# Patient Record
Sex: FEMALE | Race: BLACK OR AFRICAN AMERICAN | NOT HISPANIC OR LATINO | URBAN - METROPOLITAN AREA
[De-identification: names, ages, dates, MRNs, and addresses within clinical notes are randomized per-mention and may not be internally consistent; named-entity substitution may affect disease eponyms.]

---

## 2017-10-24 ENCOUNTER — COMMUNICATION - HEALTHEAST (OUTPATIENT)
Dept: FAMILY MEDICINE | Facility: CLINIC | Age: 52
End: 2017-10-24

## 2017-10-24 DIAGNOSIS — E87.6 HYPOKALEMIA: ICD-10-CM

## 2017-11-10 ENCOUNTER — COMMUNICATION - HEALTHEAST (OUTPATIENT)
Dept: FAMILY MEDICINE | Facility: CLINIC | Age: 52
End: 2017-11-10

## 2017-11-10 DIAGNOSIS — E87.6 HYPOKALEMIA: ICD-10-CM

## 2017-11-10 DIAGNOSIS — I10 HYPERTENSION: ICD-10-CM

## 2017-12-11 ENCOUNTER — AMBULATORY - HEALTHEAST (OUTPATIENT)
Dept: FAMILY MEDICINE | Facility: CLINIC | Age: 52
End: 2017-12-11

## 2017-12-11 ENCOUNTER — COMMUNICATION - HEALTHEAST (OUTPATIENT)
Dept: FAMILY MEDICINE | Facility: CLINIC | Age: 52
End: 2017-12-11

## 2017-12-11 DIAGNOSIS — I10 HYPERTENSION: ICD-10-CM

## 2017-12-11 DIAGNOSIS — I10 ESSENTIAL HYPERTENSION WITH GOAL BLOOD PRESSURE LESS THAN 140/90: ICD-10-CM

## 2017-12-11 DIAGNOSIS — Z12.31 VISIT FOR SCREENING MAMMOGRAM: ICD-10-CM

## 2017-12-18 ENCOUNTER — RECORDS - HEALTHEAST (OUTPATIENT)
Dept: MAMMOGRAPHY | Facility: CLINIC | Age: 52
End: 2017-12-18

## 2017-12-18 ENCOUNTER — COMMUNICATION - HEALTHEAST (OUTPATIENT)
Dept: TELEHEALTH | Facility: CLINIC | Age: 52
End: 2017-12-18

## 2017-12-18 ENCOUNTER — OFFICE VISIT - HEALTHEAST (OUTPATIENT)
Dept: FAMILY MEDICINE | Facility: CLINIC | Age: 52
End: 2017-12-18

## 2017-12-18 DIAGNOSIS — Z12.31 ENCOUNTER FOR SCREENING MAMMOGRAM FOR MALIGNANT NEOPLASM OF BREAST: ICD-10-CM

## 2017-12-18 DIAGNOSIS — R74.01 NONSPECIFIC ELEVATION OF LEVELS OF TRANSAMINASE OR LACTIC ACID DEHYDROGENASE (LDH): ICD-10-CM

## 2017-12-18 DIAGNOSIS — Z00.00 ANNUAL PHYSICAL EXAM: ICD-10-CM

## 2017-12-18 DIAGNOSIS — Z23 NEED FOR IMMUNIZATION AGAINST INFLUENZA: ICD-10-CM

## 2017-12-18 DIAGNOSIS — R74.02 NONSPECIFIC ELEVATION OF LEVELS OF TRANSAMINASE OR LACTIC ACID DEHYDROGENASE (LDH): ICD-10-CM

## 2017-12-18 DIAGNOSIS — E78.00 PURE HYPERCHOLESTEROLEMIA: ICD-10-CM

## 2017-12-18 DIAGNOSIS — I10 ESSENTIAL HYPERTENSION: ICD-10-CM

## 2017-12-18 DIAGNOSIS — N92.6 IRREGULAR MENSES: ICD-10-CM

## 2017-12-18 DIAGNOSIS — E87.6 HYPOKALEMIA: ICD-10-CM

## 2017-12-18 LAB
ANION GAP SERPL CALCULATED.3IONS-SCNC: 12 MMOL/L (ref 5–18)
BUN SERPL-MCNC: 10 MG/DL (ref 8–22)
CALCIUM SERPL-MCNC: 9 MG/DL (ref 8.5–10.5)
CHLORIDE SERPLBLD-SCNC: 107 MMOL/L (ref 98–107)
CHOLEST SERPL-MCNC: 98 MG/DL
CHOLEST SERPL-MCNC: 98 MG/DL (ref ?–199)
CO2 SERPL-SCNC: 24 MMOL/L (ref 22–31)
CREAT SERPL-MCNC: 0.82 MG/DL (ref 0.6–1.1)
ERYTHROCYTE [DISTWIDTH] IN BLOOD BY AUTOMATED COUNT: 12.2 % (ref 11–14.5)
FASTING STATUS PATIENT QL REPORTED: YES
GFR SERPL CREATININE-BSD FRML MDRD: >60 ML/MIN/1.73M2
GLUCOSE SERPL-MCNC: 91 MG/DL (ref 70–125)
HBA1C MFR BLD: 5.5 % (ref 3.5–6)
HCT VFR BLD AUTO: 40.2 % (ref 35–47)
HDLC SERPL-MCNC: 54 MG/DL
HDLC SERPL-MCNC: 54 MG/DL (ref 50–?)
HEMOGLOBIN: 13.2 G/DL (ref 12–16)
LDLC SERPL CALC-MCNC: 36 MG/DL
LDLC SERPL CALC-MCNC: 36 MG/DL (ref ?–129)
MCH RBC QN AUTO: 29.5 PG (ref 27–34)
MCHC RBC AUTO-ENTMCNC: 32.7 G/DL (ref 32–36)
MCV RBC AUTO: 90 FL (ref 80–100)
PLATELET # BLD AUTO: 271 10^9/L (ref 140–440)
PMV BLD: 7.9 FL (ref 7–10)
POTASSIUM SERPL-SCNC: 4 MMOL/L (ref 3.5–5)
RBC # BLD AUTO: 4.46 10^12/L (ref 3.8–5.4)
SODIUM SERPL-SCNC: 143 MMOL/L (ref 136–145)
TRIGL SERPL-MCNC: 38 MG/DL
TRIGL SERPL-MCNC: 38 MG/DL (ref ?–149)
WBC # BLD AUTO: 4.8 10^9/L (ref 4–11)

## 2017-12-18 ASSESSMENT — MIFFLIN-ST. JEOR: SCORE: 1826.46

## 2017-12-20 LAB
HUMAN PAPILLOMA VIRUS 16 DNA: NEGATIVE
HUMAN PAPILLOMA VIRUS 18 DNA: NEGATIVE
HUMAN PAPILLOMA VIRUS FINAL DIAGNOSIS: NORMAL
HUMAN PAPILLOMA VIRUS OTHER HR: NEGATIVE
SPECIMEN DESCRIPTION: NORMAL

## 2017-12-22 LAB
BKR LAB AP ABNORMAL BLEEDING: YES
BKR LAB AP BIRTH CONTROL/HORMONES: NORMAL
BKR LAB AP CERVICAL APPEARANCE: NORMAL
BKR LAB AP GYN ADEQUACY: NORMAL
BKR LAB AP GYN INTERPRETATION: NORMAL
BKR LAB AP GYN OTHER FINDINGS: NORMAL
BKR LAB AP HPV REFLEX: NORMAL
BKR LAB AP LMP: NORMAL
BKR LAB AP PATIENT STATUS: NORMAL
BKR LAB AP PREVIOUS ABNORMAL: NORMAL
BKR LAB AP PREVIOUS NORMAL: NORMAL
HIGH RISK?: NO
PATH REPORT.COMMENTS IMP SPEC: NORMAL
RESULT FLAG (HE HISTORICAL CONVERSION): NORMAL

## 2017-12-26 ENCOUNTER — COMMUNICATION - HEALTHEAST (OUTPATIENT)
Dept: FAMILY MEDICINE | Facility: CLINIC | Age: 52
End: 2017-12-26

## 2018-01-06 ENCOUNTER — COMMUNICATION - HEALTHEAST (OUTPATIENT)
Dept: FAMILY MEDICINE | Facility: CLINIC | Age: 53
End: 2018-01-06

## 2018-01-06 DIAGNOSIS — E78.00 PURE HYPERCHOLESTEROLEMIA: ICD-10-CM

## 2018-01-06 DIAGNOSIS — Z79.01 ANTICOAGULANT LONG-TERM USE: ICD-10-CM

## 2018-01-09 ENCOUNTER — COMMUNICATION - HEALTHEAST (OUTPATIENT)
Dept: FAMILY MEDICINE | Facility: CLINIC | Age: 53
End: 2018-01-09

## 2018-01-09 ENCOUNTER — AMBULATORY - HEALTHEAST (OUTPATIENT)
Dept: NURSING | Facility: CLINIC | Age: 53
End: 2018-01-09

## 2018-01-09 DIAGNOSIS — H53.9 VISION ABNORMALITIES: ICD-10-CM

## 2018-02-12 ENCOUNTER — COMMUNICATION - HEALTHEAST (OUTPATIENT)
Dept: FAMILY MEDICINE | Facility: CLINIC | Age: 53
End: 2018-02-12

## 2018-02-12 DIAGNOSIS — E87.6 HYPOKALEMIA: ICD-10-CM

## 2018-02-12 DIAGNOSIS — I10 HYPERTENSION: ICD-10-CM

## 2018-04-05 ENCOUNTER — COMMUNICATION - HEALTHEAST (OUTPATIENT)
Dept: FAMILY MEDICINE | Facility: CLINIC | Age: 53
End: 2018-04-05

## 2018-04-05 ENCOUNTER — OFFICE VISIT - HEALTHEAST (OUTPATIENT)
Dept: FAMILY MEDICINE | Facility: CLINIC | Age: 53
End: 2018-04-05

## 2018-04-05 DIAGNOSIS — I10 ESSENTIAL HYPERTENSION WITH GOAL BLOOD PRESSURE LESS THAN 140/90: ICD-10-CM

## 2018-04-05 DIAGNOSIS — I10 HYPERTENSION: ICD-10-CM

## 2018-04-05 DIAGNOSIS — I25.10 CORONARY ARTERY DISEASE INVOLVING NATIVE CORONARY ARTERY: ICD-10-CM

## 2018-04-05 DIAGNOSIS — I10 ESSENTIAL HYPERTENSION: ICD-10-CM

## 2018-04-05 DIAGNOSIS — E66.01 MORBID OBESITY (H): ICD-10-CM

## 2018-04-18 ENCOUNTER — DOCUMENTATION ONLY (OUTPATIENT)
Dept: CARDIOLOGY | Facility: CLINIC | Age: 53
End: 2018-04-18

## 2018-04-26 ENCOUNTER — RECORDS - HEALTHEAST (OUTPATIENT)
Dept: ADMINISTRATIVE | Facility: OTHER | Age: 53
End: 2018-04-26

## 2018-04-26 ENCOUNTER — OFFICE VISIT (OUTPATIENT)
Dept: CARDIOLOGY | Facility: CLINIC | Age: 53
End: 2018-04-26
Payer: COMMERCIAL

## 2018-04-26 VITALS
HEART RATE: 84 BPM | WEIGHT: 272.2 LBS | HEIGHT: 68 IN | DIASTOLIC BLOOD PRESSURE: 92 MMHG | SYSTOLIC BLOOD PRESSURE: 138 MMHG | BODY MASS INDEX: 41.25 KG/M2

## 2018-04-26 DIAGNOSIS — I25.42 DISSECTION OF CORONARY ARTERY: ICD-10-CM

## 2018-04-26 DIAGNOSIS — I24.9 ACS (ACUTE CORONARY SYNDROME) (H): Primary | ICD-10-CM

## 2018-04-26 DIAGNOSIS — R07.9 CHEST PAIN, UNSPECIFIED TYPE: ICD-10-CM

## 2018-04-26 PROCEDURE — 93000 ELECTROCARDIOGRAM COMPLETE: CPT | Performed by: INTERNAL MEDICINE

## 2018-04-26 PROCEDURE — 99214 OFFICE O/P EST MOD 30 MIN: CPT | Performed by: INTERNAL MEDICINE

## 2018-04-26 NOTE — LETTER
4/26/2018      [address] [address]      RE: Chula Palacios       Dear Colleague,    I had the pleasure of seeing Chula Palacios in the [facility].    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  IVUS was performed.  I have looked at those images.  She had no significant disease in the remainder of her coronary arteries, and she was managed medically for this, as she had NAFISA 3 flow.  She was placed on Brilinta, and actually she has not been taken off of it.  She also was noted to have hypertension and was started on Norvasc with the Cozaar and metoprolol.  She is also on Lipitor 40.  She has not had any concerning symptoms of chest pain, tightness or shortness of breath.  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  She has 3 grown children.  No tobacco use and is single.  There is no family history of early coronary artery disease.      Ms. Palacios has not had evaluation for FMD, so that would be indicated and I discussed that with her today.  We also discussed she did not have evaluation following her heart attack to look for ischemia, and actually a couple weeks ago, she was going up and down flights of stairs with heavy objects and had some pain under her breast bone.  It was worse with deep inspiration and better at rest.  She saw her primary physician, and they thought it was maybe a pulled muscle and has since improved.  Her blood pressure was up a bit today, but she reports she checks it at home and it is not as elevated, was 138/90.  Her weight has gone up about 20 pounds since 2015, and she knows she needs to work on diet and exercise.      IMPRESSION, REPORT, PLAN:   1.  Spontaneous coronary artery dissection of the  mid-LAD, managed medically, can come off Brilinta.   2.  Hypertension.   3.  Hyperlipidemia.   4.  Need for FMD evaluation.   5.  Obesity.      DISCUSSION:  It was a pleasure to see Ms. Palacios in Cardiology Clinic.  Today I discussed her history and symptoms as well as reviewed all of her previous imaging.  I would like to evaluate for FMD, so I ordered a CT of the head, neck, chest, abdomen and pelvis.  In addition, I discussed blood pressure is up a bit.  When she comes back, we will recheck this and may have to adjust her medications, maybe increase her Cozaar if it still remains high, and then we will do an exercise stress echo.  She understands and is in agreement with the plan as outlined.  All questions were answered.  It was a pleasure to see her.  Please do not hesitate to contact me with any questions or concerns.         ALANNAH CUNNINGHAM MD             D: 2018   T: 2018   MT: MARYANA      Name:     CARI PALACIOS   MRN:      [id number]        Account:      [id number]   :      1965           Service Date: 2018      Document: [id number]         Outpatient Encounter Prescriptions as of 2018   Medication Sig Dispense Refill     amLODIPine (NORVASC) 10 MG tablet Take 1 tablet (10 mg) by mouth daily 90 tablet 0     aspirin EC 81 MG EC tablet Take 1 tablet (81 mg) by mouth daily       atorvastatin (LIPITOR) 40 MG tablet Take 1 tablet (40 mg) by mouth daily 30 tablet 12     losartan (COZAAR) 50 MG tablet Take 1 tablet (50 mg) by mouth daily 90 tablet 0     metoprolol (LOPRESSOR) 100 MG tablet Take 1 tablet (100 mg) by mouth 2 times daily 60 tablet 12     nitroglycerin (NITROSTAT) 0.4 MG SL tablet Place 1 tablet (0.4 mg) under the tongue every 5 minutes as needed for chest pain (Maximum 3 doses in 15 minutes, if needed.) 25 tablet 1     Potassium Chloride ER 20 MEQ TBCR Take 1 tablet (20 mEq) by mouth daily 90 tablet 4     [DISCONTINUED] ticagrelor (BRILINTA) 90 MG tablet Take 1  tablet (90 mg) by mouth 2 times daily 60 tablet 12     No facility-administered encounter medications on file as of 4/26/2018.            Again, thank you for allowing me to participate in the care of your patient.      Sincerely,    Lilly Munroe MD     [facility]

## 2018-04-26 NOTE — PROGRESS NOTES
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  IVUS was performed.  I have looked at those images.  She had no significant disease in the remainder of her coronary arteries, and she was managed medically for this, as she had NAFISA 3 flow.  She was placed on Brilinta, and actually she has not been taken off of it.  She also was noted to have hypertension and was started on Norvasc with the Cozaar and metoprolol.  She is also on Lipitor 40.  She has not had any concerning symptoms of chest pain, tightness or shortness of breath.  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  She has 3 grown children.  No tobacco use and is single.  There is no family history of early coronary artery disease.      Ms. Palacios has not had evaluation for FMD, so that would be indicated and I discussed that with her today.  We also discussed she did not have evaluation following her heart attack to look for ischemia, and actually a couple weeks ago, she was going up and down flights of stairs with heavy objects and had some pain under her breast bone.  It was worse with deep inspiration and better at rest.  She saw her primary physician, and they thought it was maybe a pulled muscle and has since improved.  Her blood pressure was up a bit today, but she reports she checks it at home and it is not as elevated, was 138/90.  Her weight has gone up about 20 pounds since 2015, and she knows she needs to work on diet and exercise.      IMPRESSION, REPORT, PLAN:   1.  Spontaneous coronary artery dissection of the mid-LAD, managed medically, can come off Brilinta.   2.  Hypertension.   3.  Hyperlipidemia.   4.  Need for FMD evaluation.   5.  Obesity.      DISCUSSION:  It was a pleasure to see Ms. Palacios in Cardiology Clinic.  Today I  discussed her history and symptoms as well as reviewed all of her previous imaging.  I would like to evaluate for FMD, so I ordered a CT of the head, neck, chest, abdomen and pelvis.  In addition, I discussed blood pressure is up a bit.  When she comes back, we will recheck this and may have to adjust her medications, maybe increase her Cozaar if it still remains high, and then we will do an exercise stress echo.  She understands and is in agreement with the plan as outlined.  All questions were answered.  It was a pleasure to see her.  Please do not hesitate to contact me with any questions or concerns.         ALANNAH CUNNINGHAM MD             D: 2018   T: 2018   MT: MRAYANA      Name:     CARI DE LA ROSA   MRN:      [id number]        Account:      [id number]   :      1965           Service Date: 2018      Document: [id number]

## 2018-04-26 NOTE — LETTER
4/26/2018    [address] [address]    RE: Chula Palacios       Dear Colleague,    I had the pleasure of seeing Chula Palacios in the [facility].    HPI:     Please see dictated note    PAST MEDICAL HISTORY:  Past Medical History:   Diagnosis Date     Chest pain 5/26/2015     Dissection of coronary artery      Hypertension      NSTEMI (non-ST elevated myocardial infarction) (H) 5/2015       CURRENT MEDICATIONS:  Current Outpatient Prescriptions   Medication Sig Dispense Refill     amLODIPine (NORVASC) 10 MG tablet Take 1 tablet (10 mg) by mouth daily 90 tablet 0     aspirin EC 81 MG EC tablet Take 1 tablet (81 mg) by mouth daily       atorvastatin (LIPITOR) 40 MG tablet Take 1 tablet (40 mg) by mouth daily 30 tablet 12     losartan (COZAAR) 50 MG tablet Take 1 tablet (50 mg) by mouth daily 90 tablet 0     metoprolol (LOPRESSOR) 100 MG tablet Take 1 tablet (100 mg) by mouth 2 times daily 60 tablet 12     nitroglycerin (NITROSTAT) 0.4 MG SL tablet Place 1 tablet (0.4 mg) under the tongue every 5 minutes as needed for chest pain (Maximum 3 doses in 15 minutes, if needed.) 25 tablet 1     Potassium Chloride ER 20 MEQ TBCR Take 1 tablet (20 mEq) by mouth daily 90 tablet 4     ticagrelor (BRILINTA) 90 MG tablet Take 1 tablet (90 mg) by mouth 2 times daily 60 tablet 12       PAST SURGICAL HISTORY:  Past Surgical History:   Procedure Laterality Date     CORONARY ANGIOGRAPHY ADULT ORDER  5/2015    coronary artery dissection       ALLERGIES     Allergies   Allergen Reactions     Imdur [Isosorbide]      Headaches       FAMILY HISTORY:  Family History   Problem Relation Age of Onset     Hypertension Father        SOCIAL HISTORY:  Social History     Social History     Marital status: Single     Spouse name: N/A     Number of children: N/A     Years of education: N/A     Social History Main Topics     Smoking status: Never Smoker     Smokeless tobacco: Never  "Used     Alcohol use Yes      Comment: wine occasionally      Drug use: No     Sexual activity: Yes     Partners: Male     Other Topics Concern     Caffeine Concern No     not currently     Sleep Concern No     most of the time     Stress Concern No     Weight Concern No     Special Diet Yes     low sodium, low cholesterol, low fat     Exercise Yes     cardiac rehab      Seat Belt Yes     Social History Narrative       ROS:   Constitutional: No fever, chills, or sweats. No weight gain/loss   ENT: No visual disturbance, ear ache, epistaxis, sore throat  Allergies/Immunologic: Negative.   Respiratory: No cough, hemoptysia  Cardiovascular: As per HPI  GI: No nausea, vomiting, hematemesis, melena, or hematochezia  : No urinary frequency, dysuria, or hematuria  Integument: Negative  Psychiatric: Negative  Neuro: Negative  Endocrinology: Negative   Musculoskeletal: Negative    EXAM:  BP (!) 138/92  Pulse 84  Ht 1.727 m (5' 8\")  Wt 123.5 kg (272 lb 3.2 oz)  BMI 41.39 kg/m2  In general, the patient is a pleasant female in no apparent distress.    HEENT: NC/AT.  PERRLA.  EOMI.  Sclerae white, not injected.  Nares clear.  Pharynx without erythema or exudate.  Dentition intact.    Neck: No adenopathy.  No thyromegaly. Carotids +4/4 bilaterally without bruits.  No jugular venous distension.   Heart: RRR. Normal S1, S2 splits physiologically. No murmur, rub, click, or gallop.  Lungs: CTA.  No ronchi, wheezes, rales.    Abdomen: Soft, nontender, nondistended.  Extremities: No clubbing, cyanosis, or edema.    Neurologic: Alert and oriented to person/place/time, normal speech, gait and affect  Skin: No petechiae, purpura or rash.    Labs:  LIPID RESULTS:  Lab Results   Component Value Date    CHOL 98 12/18/2017    HDL 54 12/18/2017    LDL 36 12/18/2017    TRIG 38 12/18/2017    CHOLHDLRATIO 1.7 05/11/2015       LIVER ENZYME RESULTS:  Lab Results   Component Value Date    AST 18 05/10/2015    ALT 27 05/10/2015       CBC " RESULTS:  Lab Results   Component Value Date    WBC 4.8 12/18/2017    RBC 4.46 12/18/2017    HGB 13.2 12/18/2017    HCT 40.2 12/18/2017    MCV 90 12/18/2017    MCH 29.5 12/18/2017    MCHC 32.7 12/18/2017    RDW 12.2 12/18/2017     12/18/2017       BMP RESULTS:  Lab Results   Component Value Date     12/18/2017    POTASSIUM 4.0 12/18/2017    CHLORIDE 107 12/18/2017    CO2 24 12/18/2017    ANIONGAP 12 12/18/2017    GLC 91 12/18/2017    BUN 10 12/18/2017    CR 0.82 12/18/2017    GFRESTIMATED >60 12/18/2017    GFRESTBLACK >60 12/18/2017    CORTES 9.0 12/18/2017      GRAY Munroe MD     CC  Patient Care Team:  Clinic, [facility] as PCP - General  CLINIC, [facility]    Service Date: 04/26/2018      HISTORY OF PRESENT ILLNESS:  Chula is a pleasant 52-year-old woman who presented in 05/2015 with chest pain and was found to have a spontaneous coronary artery dissection of the mid LAD.  IVUS was performed.  I have looked at those images.  She had no significant disease in the remainder of her coronary arteries, and she was managed medically for this, as she had NAFISA 3 flow.  She was placed on Brilinta, and actually she has not been taken off of it.  She also was noted to have hypertension and was started on Norvasc with the Cozaar and metoprolol.  She is also on Lipitor 40.  She has not had any concerning symptoms of chest pain, tightness or shortness of breath.  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  She has 3 grown children.  No tobacco use and is single.  There is no family history of early coronary artery disease.      Ms. Palacios has not had evaluation for FMD, so that would be indicated and I discussed that with her today.  We also discussed she did not have evaluation following her heart attack to look for ischemia, and actually a couple weeks ago, she was going up and down flights of stairs with heavy objects and had some pain  under her breast bone.  It was worse with deep inspiration and better at rest.  She saw her primary physician, and they thought it was maybe a pulled muscle and has since improved.  Her blood pressure was up a bit today, but she reports she checks it at home and it is not as elevated, was 138/90.  Her weight has gone up about 20 pounds since 2015, and she knows she needs to work on diet and exercise.      IMPRESSION, REPORT, PLAN:   1.  Spontaneous coronary artery dissection of the mid-LAD, managed medically, can come off Brilinta.   2.  Hypertension.   3.  Hyperlipidemia.   4.  Need for FMD evaluation.   5.  Obesity.      DISCUSSION:  It was a pleasure to see Ms. Palacios in Cardiology Clinic.  Today I discussed her history and symptoms as well as reviewed all of her previous imaging.  I would like to evaluate for FMD, so I ordered a CT of the head, neck, chest, abdomen and pelvis.  In addition, I discussed blood pressure is up a bit.  When she comes back, we will recheck this and may have to adjust her medications, maybe increase her Cozaar if it still remains high, and then we will do an exercise stress echo.  She understands and is in agreement with the plan as outlined.  All questions were answered.  It was a pleasure to see her.  Please do not hesitate to contact me with any questions or concerns.         ALANNAH CUNNINGHAM MD             D: 2018   T: 2018   MT: MARYANA      Name:     CARI PALACIOS   MRN:      [id number]        Account:      [id number]   :      1965           Service Date: 2018      Document: [id number]       Thank you for allowing me to participate in the care of your patient.      Sincerely,     Alannah Cunningham MD     [facility]    cc:   [place] [address]

## 2018-04-26 NOTE — PROGRESS NOTES
HPI:     Please see dictated note    PAST MEDICAL HISTORY:  Past Medical History:   Diagnosis Date     Chest pain 5/26/2015     Dissection of coronary artery      Hypertension      NSTEMI (non-ST elevated myocardial infarction) (H) 5/2015       CURRENT MEDICATIONS:  Current Outpatient Prescriptions   Medication Sig Dispense Refill     amLODIPine (NORVASC) 10 MG tablet Take 1 tablet (10 mg) by mouth daily 90 tablet 0     aspirin EC 81 MG EC tablet Take 1 tablet (81 mg) by mouth daily       atorvastatin (LIPITOR) 40 MG tablet Take 1 tablet (40 mg) by mouth daily 30 tablet 12     losartan (COZAAR) 50 MG tablet Take 1 tablet (50 mg) by mouth daily 90 tablet 0     metoprolol (LOPRESSOR) 100 MG tablet Take 1 tablet (100 mg) by mouth 2 times daily 60 tablet 12     nitroglycerin (NITROSTAT) 0.4 MG SL tablet Place 1 tablet (0.4 mg) under the tongue every 5 minutes as needed for chest pain (Maximum 3 doses in 15 minutes, if needed.) 25 tablet 1     Potassium Chloride ER 20 MEQ TBCR Take 1 tablet (20 mEq) by mouth daily 90 tablet 4     ticagrelor (BRILINTA) 90 MG tablet Take 1 tablet (90 mg) by mouth 2 times daily 60 tablet 12       PAST SURGICAL HISTORY:  Past Surgical History:   Procedure Laterality Date     CORONARY ANGIOGRAPHY ADULT ORDER  5/2015    coronary artery dissection       ALLERGIES     Allergies   Allergen Reactions     Imdur [Isosorbide]      Headaches       FAMILY HISTORY:  Family History   Problem Relation Age of Onset     Hypertension Father        SOCIAL HISTORY:  Social History     Social History     Marital status: Single     Spouse name: N/A     Number of children: N/A     Years of education: N/A     Social History Main Topics     Smoking status: Never Smoker     Smokeless tobacco: Never Used     Alcohol use Yes      Comment: wine occasionally      Drug use: No     Sexual activity: Yes     Partners: Male     Other Topics Concern     Caffeine Concern No     not currently     Sleep Concern No     most of  "the time     Stress Concern No     Weight Concern No     Special Diet Yes     low sodium, low cholesterol, low fat     Exercise Yes     cardiac rehab      Seat Belt Yes     Social History Narrative       ROS:   Constitutional: No fever, chills, or sweats. No weight gain/loss   ENT: No visual disturbance, ear ache, epistaxis, sore throat  Allergies/Immunologic: Negative.   Respiratory: No cough, hemoptysia  Cardiovascular: As per HPI  GI: No nausea, vomiting, hematemesis, melena, or hematochezia  : No urinary frequency, dysuria, or hematuria  Integument: Negative  Psychiatric: Negative  Neuro: Negative  Endocrinology: Negative   Musculoskeletal: Negative    EXAM:  BP (!) 138/92  Pulse 84  Ht 1.727 m (5' 8\")  Wt 123.5 kg (272 lb 3.2 oz)  BMI 41.39 kg/m2  In general, the patient is a pleasant female in no apparent distress.    HEENT: NC/AT.  PERRLA.  EOMI.  Sclerae white, not injected.  Nares clear.  Pharynx without erythema or exudate.  Dentition intact.    Neck: No adenopathy.  No thyromegaly. Carotids +4/4 bilaterally without bruits.  No jugular venous distension.   Heart: RRR. Normal S1, S2 splits physiologically. No murmur, rub, click, or gallop.  Lungs: CTA.  No ronchi, wheezes, rales.    Abdomen: Soft, nontender, nondistended.  Extremities: No clubbing, cyanosis, or edema.    Neurologic: Alert and oriented to person/place/time, normal speech, gait and affect  Skin: No petechiae, purpura or rash.    Labs:  LIPID RESULTS:  Lab Results   Component Value Date    CHOL 98 12/18/2017    HDL 54 12/18/2017    LDL 36 12/18/2017    TRIG 38 12/18/2017    CHOLHDLRATIO 1.7 05/11/2015       LIVER ENZYME RESULTS:  Lab Results   Component Value Date    AST 18 05/10/2015    ALT 27 05/10/2015       CBC RESULTS:  Lab Results   Component Value Date    WBC 4.8 12/18/2017    RBC 4.46 12/18/2017    HGB 13.2 12/18/2017    HCT 40.2 12/18/2017    MCV 90 12/18/2017    MCH 29.5 12/18/2017    MCHC 32.7 12/18/2017    RDW 12.2 12/18/2017 "     12/18/2017       BMP RESULTS:  Lab Results   Component Value Date     12/18/2017    POTASSIUM 4.0 12/18/2017    CHLORIDE 107 12/18/2017    CO2 24 12/18/2017    ANIONGAP 12 12/18/2017    GLC 91 12/18/2017    BUN 10 12/18/2017    CR 0.82 12/18/2017    GFRESTIMATED >60 12/18/2017    GFRESTBLACK >60 12/18/2017    CORTES 9.0 12/18/2017      GRAY Munroe MD     CC  Patient Care Team:  Clinic, [facility] as PCP - General  CLINIC, [facility]

## 2018-04-26 NOTE — MR AVS SNAPSHOT
After Visit Summary   4/26/2018    Chula Palacios    MRN: [id number]           Patient Information     Date Of Birth          1965        Visit Information        Provider Department      4/26/2018 12:45 PM March, Lilly Bledsoe MD [facility]        Today's Diagnoses     ACS (acute coronary syndrome) (H)    -  1    Dissection of coronary artery        Chest pain, unspecified type           Follow-ups after your visit        Additional Services     Follow-Up with Cardiac Advanced Practice Provider           Follow-Up with Cardiologist                 Future tests that were ordered for you today     Open Future Orders        Priority Expected Expires Ordered    Follow-Up with Cardiologist Routine 4/26/2019 4/27/2019 4/26/2018    Follow-Up with Cardiac Advanced Practice Provider Routine 5/26/2018 4/26/2019 4/26/2018    Exercise Stress Echocardiogram Routine  4/26/2019 4/26/2018    CT Head Neck Angio w/o & w Contrast Routine  4/26/2019 4/26/2018    CT Chest Abdomen Pelvis w/o & w Contrast Routine  4/26/2019 4/26/2018            Who to contact     If you have questions or need follow up information about today's clinic visit or your schedule please contact [facility] directly at [phone].  Normal or non-critical lab and imaging results will be communicated to you by Ossiahart, letter or phone within 4 business days after the clinic has received the results. If you do not hear from us within 7 days, please contact the clinic through Ossiahart or phone. If you have a critical or abnormal lab result, we will notify you by phone as soon as possible.  Submit refill requests through [facility] or call your pharmacy and they will forward the refill request to us. Please allow 3 business days for your refill to be completed.          Additional Information About Your Visit        [facility] Information     [facility] lets you send messages to  "your doctor, view your test results, renew your prescriptions, schedule appointments and more. To sign up, go to [email or web address] . Click on \"Log in\" on the left side of the screen, which will take you to the Welcome page. Then click on \"Sign up Now\" on the right side of the page.     You will be asked to enter the access code listed below, as well as some personal information. Please follow the directions to create your username and password.     Your access code is: [email or web address]  Expires: 2018  1:12 PM     Your access code will  in 90 days. If you need help or a new code, please call your [place] clinic or [phone].        Care EveryWhere ID     This is your Care EveryWhere ID. This could be used by other organizations to access your [place] medical records  [id number]        Your Vitals Were     Pulse Height BMI (Body Mass Index)             84 1.727 m (5' 8\") 41.39 kg/m2          Blood Pressure from Last 3 Encounters:   18 (!) 138/92   10/20/15 110/80   07/20/15 118/86    Weight from Last 3 Encounters:   18 123.5 kg (272 lb 3.2 oz)   10/20/15 113.4 kg (250 lb)   07/20/15 116.6 kg (257 lb)              We Performed the Following     EKG 12-lead complete w/read - Clinics (performed today)          Today's Medication Changes          These changes are accurate as of 18  1:12 PM.  If you have any questions, ask your nurse or doctor.               Stop taking these medicines if you haven't already. Please contact your care team if you have questions.     ticagrelor 90 MG tablet   Commonly known as:  BRILINTA   Stopped by:  MarchLilly MD                    Primary Care Provider Office Phone # Fax #    [facility] [phone]       [address] [place] [address]        Equal Access to Services     JACINTO ELIZABETH AH: padmaja Cardenas, jose marceloaan ah. " So [facility] [phone].    ATENCIÓN: Si habla edgar, tiene a kline disposición servicios gratuitos de asistencia lingüística. Yvette cruz [phone].    We comply with applicable federal civil rights laws and [place] laws. We do not discriminate on the basis of race, color, national origin, age, disability, sex, sexual orientation, or gender identity.            Thank you!     Thank you for choosing [facility]  for your care. Our goal is always to provide you with excellent care. Hearing back from our patients is one way we can continue to improve our services. Please take a few minutes to complete the written survey that you may receive in the mail after your visit with us. Thank you!             Your Updated Medication List - Protect others around you: Learn how to safely use, store and throw away your medicines at www.disposemymeds.org.          This list is accurate as of 4/26/18  1:12 PM.  Always use your most recent med list.                   Brand Name Dispense Instructions for use Diagnosis    amLODIPine 10 MG tablet    NORVASC    90 tablet    Take 1 tablet (10 mg) by mouth daily    Unspecified essential hypertension       aspirin 81 MG EC tablet      Take 1 tablet (81 mg) by mouth daily    Dissection of coronary artery, ACS (acute coronary syndrome) (H), Acute myocardial infarction, initial episode of care       atorvastatin 40 MG tablet    LIPITOR    30 tablet    Take 1 tablet (40 mg) by mouth daily    Dissection of coronary artery, Acute myocardial infarction, initial episode of care       losartan 50 MG tablet    COZAAR    90 tablet    Take 1 tablet (50 mg) by mouth daily    ACS (acute coronary syndrome) (H)       metoprolol tartrate 100 MG tablet    LOPRESSOR    60 tablet    Take 1 tablet (100 mg) by mouth 2 times daily    Dissection of coronary artery, Acute myocardial infarction, initial episode of care, ACS (acute coronary syndrome) (H)       nitroGLYcerin 0.4 MG  sublingual tablet    NITROSTAT    25 tablet    Place 1 tablet (0.4 mg) under the tongue every 5 minutes as needed for chest pain (Maximum 3 doses in 15 minutes, if needed.)    Dissection of coronary artery, Acute myocardial infarction, initial episode of care, ACS (acute coronary syndrome) (H)       Potassium Chloride ER 20 MEQ Tbcr     90 tablet    Take 1 tablet (20 mEq) by mouth daily    ACS (acute coronary syndrome) (H)

## 2018-05-04 ENCOUNTER — COMMUNICATION - HEALTHEAST (OUTPATIENT)
Dept: FAMILY MEDICINE | Facility: CLINIC | Age: 53
End: 2018-05-04

## 2018-05-04 DIAGNOSIS — I25.10 CORONARY ARTERY DISEASE INVOLVING NATIVE CORONARY ARTERY: ICD-10-CM

## 2018-05-08 ENCOUNTER — RECORDS - HEALTHEAST (OUTPATIENT)
Dept: ADMINISTRATIVE | Facility: OTHER | Age: 53
End: 2018-05-08

## 2018-05-10 ENCOUNTER — HOSPITAL ENCOUNTER (OUTPATIENT)
Dept: CT IMAGING | Facility: CLINIC | Age: 53
End: 2018-05-10
Attending: INTERNAL MEDICINE
Payer: COMMERCIAL

## 2018-05-10 ENCOUNTER — HOSPITAL ENCOUNTER (OUTPATIENT)
Dept: CARDIOLOGY | Facility: CLINIC | Age: 53
Discharge: HOME | End: 2018-05-10
Attending: INTERNAL MEDICINE | Admitting: INTERNAL MEDICINE
Payer: COMMERCIAL

## 2018-05-10 DIAGNOSIS — I25.42 DISSECTION OF CORONARY ARTERY: ICD-10-CM

## 2018-05-10 LAB — RADIOLOGIST FLAGS: ABNORMAL

## 2018-05-10 PROCEDURE — 93018 CV STRESS TEST I&R ONLY: CPT | Performed by: INTERNAL MEDICINE

## 2018-05-10 PROCEDURE — 93350 STRESS TTE ONLY: CPT | Mod: 26 | Performed by: INTERNAL MEDICINE

## 2018-05-10 PROCEDURE — 25500064 ZZH RX 255 OP 636: Performed by: INTERNAL MEDICINE

## 2018-05-10 PROCEDURE — 93325 DOPPLER ECHO COLOR FLOW MAPG: CPT | Mod: 26 | Performed by: INTERNAL MEDICINE

## 2018-05-10 PROCEDURE — 25000125 ZZHC RX 250: Performed by: INTERNAL MEDICINE

## 2018-05-10 PROCEDURE — 25000128 H RX IP 250 OP 636: Performed by: INTERNAL MEDICINE

## 2018-05-10 PROCEDURE — 93017 CV STRESS TEST TRACING ONLY: CPT

## 2018-05-10 PROCEDURE — 71270 CT THORAX DX C-/C+: CPT

## 2018-05-10 PROCEDURE — 93016 CV STRESS TEST SUPVJ ONLY: CPT | Performed by: INTERNAL MEDICINE

## 2018-05-10 PROCEDURE — 93321 DOPPLER ECHO F-UP/LMTD STD: CPT | Mod: 26 | Performed by: INTERNAL MEDICINE

## 2018-05-10 PROCEDURE — 70498 CT ANGIOGRAPHY NECK: CPT

## 2018-05-10 RX ORDER — IOPAMIDOL 755 MG/ML
100 INJECTION, SOLUTION INTRAVASCULAR ONCE
Status: COMPLETED | OUTPATIENT
Start: 2018-05-10 | End: 2018-05-10

## 2018-05-10 RX ADMIN — SODIUM CHLORIDE 90 ML: 9 INJECTION, SOLUTION INTRAVENOUS at 14:10

## 2018-05-10 RX ADMIN — IOPAMIDOL 100 ML: 755 INJECTION, SOLUTION INTRAVENOUS at 14:10

## 2018-05-10 RX ADMIN — HUMAN ALBUMIN MICROSPHERES AND PERFLUTREN 2 ML: 10; .22 INJECTION, SOLUTION INTRAVENOUS at 11:32

## 2018-05-11 ENCOUNTER — TELEPHONE (OUTPATIENT)
Dept: CARDIOLOGY | Facility: CLINIC | Age: 53
End: 2018-05-11

## 2018-05-11 DIAGNOSIS — R91.8 PULMONARY NODULES: ICD-10-CM

## 2018-05-11 DIAGNOSIS — I25.42 SPONTANEOUS DISSECTION OF CORONARY ARTERY: ICD-10-CM

## 2018-05-11 DIAGNOSIS — N63.0 BREAST NODULE: Primary | ICD-10-CM

## 2018-05-11 NOTE — TELEPHONE ENCOUNTER
Call received from [facility]- incidental findings on CT chest, abd, pelvis, showing 1.4cm nodule breast and scattered pulmonary nodules. Radiologist recommends follow up. Will alert Dr. Munroe.

## 2018-05-11 NOTE — TELEPHONE ENCOUNTER
Received message below from Dr. Munroe. Orders entered for mammogram, follow up and CT of chest in 3 months. Called to pt and gave number to [facility] to schedule the mammogram. Pt verbalized understanding and stated she would call to schedule.

## 2018-05-11 NOTE — TELEPHONE ENCOUNTER
Lilly Munroe MD Sohn, Linda J, RN; Jackelyn Chacon, RN; PIETER [facility] Heart Team 1        Caller: Unspecified (Today,  8:25 AM)                       Hello team -   I called the patient and let her know that I would like us to set up for her to have a mammogram now (would do MRI mammogram if possible) and follow up CT of the chest for pulmonary nodules in 3 months.  I should see her back in 3 months.   Thank you,   apolonia

## 2018-05-16 ENCOUNTER — RECORDS - HEALTHEAST (OUTPATIENT)
Dept: ADMINISTRATIVE | Facility: OTHER | Age: 53
End: 2018-05-16

## 2018-05-16 ENCOUNTER — OFFICE VISIT (OUTPATIENT)
Dept: CARDIOLOGY | Facility: CLINIC | Age: 53
End: 2018-05-16
Attending: INTERNAL MEDICINE
Payer: COMMERCIAL

## 2018-05-16 VITALS
HEART RATE: 85 BPM | DIASTOLIC BLOOD PRESSURE: 87 MMHG | WEIGHT: 269.5 LBS | BODY MASS INDEX: 40.98 KG/M2 | SYSTOLIC BLOOD PRESSURE: 131 MMHG

## 2018-05-16 DIAGNOSIS — E78.2 MIXED HYPERLIPIDEMIA: Primary | ICD-10-CM

## 2018-05-16 DIAGNOSIS — I25.42 DISSECTION OF CORONARY ARTERY: ICD-10-CM

## 2018-05-16 DIAGNOSIS — J30.2 ACUTE SEASONAL ALLERGIC RHINITIS, UNSPECIFIED TRIGGER: ICD-10-CM

## 2018-05-16 DIAGNOSIS — I24.9 ACS (ACUTE CORONARY SYNDROME) (H): ICD-10-CM

## 2018-05-16 DIAGNOSIS — I21.9 ACUTE MYOCARDIAL INFARCTION, INITIAL EPISODE OF CARE (H): ICD-10-CM

## 2018-05-16 DIAGNOSIS — I10 BENIGN ESSENTIAL HYPERTENSION: ICD-10-CM

## 2018-05-16 PROCEDURE — 99214 OFFICE O/P EST MOD 30 MIN: CPT | Performed by: NURSE PRACTITIONER

## 2018-05-16 RX ORDER — NITROGLYCERIN 0.4 MG/1
0.4 TABLET SUBLINGUAL EVERY 5 MIN PRN
Qty: 25 TABLET | Refills: 1 | Status: SHIPPED | OUTPATIENT
Start: 2018-05-16

## 2018-05-16 NOTE — LETTER
5/16/2018    [address] [address]    RE: Chula Silvabarrett       Dear Colleague,    I had the pleasure of seeing Chula Palacios in the [facility].    HPI and Plan:   I had the pleasure of seeing Chula Palacios today in cardiology clinic follow up. She is a pleasant 52 year old patient who recently started seeing Dr. Munroe. Ms. 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  She continued on Brilinta until her visit with Dr. Munroe last month.  She does not have a family history of  premature coronary artery disease, she is a non-smoker.    Dr. Munroe recommended a CT to exclude FMD, and the stress test to look for any evidence of ischemia she had some pressure a few weeks prior under her breastbone which was worse with deep inspiration and better with rest.  Her stress echocardiogram showed a normal EF of 55-60%, there was a small inferior apical region of akinesis, no new stress-induced wall motion abnormalities and no ischemia sepsis suspected.  EKG G portion of the stress test was negative.  She did have a borderline hypertensive response to exercise with a peak heart rate of correction a peak blood pressure of 190/100.  She had no significant valvular disease, though she did have a hint of mild pulmonary hypertension with RVSP of 31+ right atrial pressure.  She had no symptoms during her stress test and was able to reach a target heart rate.    Her CT angiography was negative for the head and neck.  However there were incidental findings on the radiology report including a 1.4 cm soft tissue and tissue nodule in the upper outer quadrant of the left breast which was recommended to be evaluated by mammography.  In addition to this it was noted that she had two pulmonary nodules, the largest measuring 9 mm, the radiologist recommended a 3  month follow-up CT.     Dr. Munroe actually ordered a mammogram and follow-up CT, however I learned today that she has had mammograms done previously in the [facility] system, there was some abnormalities on them in the past requiring further evaluation and they were compared to a mammogram she had done in [place].    Physical Exam  Please see Below     Assessment and Plan  1.  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  She was recently discontinued from Brilinta.  She continues on a low-dose aspirin.  She continues on statin therapy.  Her blood pressure is borderline controlled.  We discussed possibly increasing her losartan 200 mg daily.  She was not very excited about increasing her blood pressure medicine.  We discussed other options including more regular  exercise and weight loss, she would like to start with this.  She is asymptomatic.  Her stress echocardiogram did not demonstrate any new areas of ischemia.  She should continue on good medical management with beta-blocker, ARB, statin and aspirin.  2.  Hypertension.  As I said above, she had a borderline hypertensive response to exercise.  Her blood pressure in clinic is borderline.  She has gained about 20 pounds in the last year and a half.  She would like to try diet and exercise before increasing her losartan.  If her blood pressure continues to remain elevated with a systolic blood pressure over 130, then I would double her losartan and have her repeat a BMP.  Dr. Munroe wants to see her in August  3. Incidental findings.  She did have 2 incidental findings on her CT, both of which we discussed today.  She wonders, since she had her mammogram done at [facility] in December, if she needs to repeat this, she did have an ultrasound done of the left breast in 2016, she has known macrolobulated nodule that was stable in 2016.  Her mammogram in December 2017 did not describe the nodule. After our visit I called her primary,   Joseph Chaudhari and discussed the breast and lung nodules. He asked that she arrange follow up with him for both concerns.    Thank you for allowing me to care for Chula Palacios today, she is scheduled to see Dr. Munroe again in September.    YEISON Das, CNP  Cardiology    Voice recognition software was used for this note, I have reviewed this note, but errors may have been missed.    No orders of the defined types were placed in this encounter.    Orders Placed This Encounter   Medications     cetirizine HCl 10 MG CAPS     Sig: Take 10 mg by mouth daily as needed     Dispense:  30 capsule     Refill:  3     nitroGLYcerin (NITROSTAT) 0.4 MG sublingual tablet     Sig: Place 1 tablet (0.4 mg) under the tongue every 5 minutes as needed for chest pain (Maximum 3 doses in 15 minutes, if needed.)     Dispense:  25 tablet     Refill:  1     Medications Discontinued During This Encounter   Medication Reason     nitroglycerin (NITROSTAT) 0.4 MG SL tablet Reorder         CURRENT MEDICATIONS:  Current Outpatient Prescriptions   Medication Sig Dispense Refill     amLODIPine (NORVASC) 10 MG tablet Take 1 tablet (10 mg) by mouth daily 90 tablet 0     aspirin EC 81 MG EC tablet Take 1 tablet (81 mg) by mouth daily       atorvastatin (LIPITOR) 40 MG tablet Take 1 tablet (40 mg) by mouth daily 30 tablet 12     cetirizine HCl 10 MG CAPS Take 10 mg by mouth daily as needed 30 capsule 3     losartan (COZAAR) 50 MG tablet Take 1 tablet (50 mg) by mouth daily 90 tablet 0     metoprolol (LOPRESSOR) 100 MG tablet Take 1 tablet (100 mg) by mouth 2 times daily 60 tablet 12     nitroGLYcerin (NITROSTAT) 0.4 MG sublingual tablet Place 1 tablet (0.4 mg) under the tongue every 5 minutes as needed for chest pain (Maximum 3 doses in 15 minutes, if needed.) 25 tablet 1     Potassium Chloride ER 20 MEQ TBCR Take 1 tablet (20 mEq) by mouth daily 90 tablet 4     [DISCONTINUED] nitroglycerin (NITROSTAT) 0.4 MG SL tablet Place 1 tablet (0.4 mg) under  the tongue every 5 minutes as needed for chest pain (Maximum 3 doses in 15 minutes, if needed.) 25 tablet 1       ALLERGIES     Allergies   Allergen Reactions     Imdur [Isosorbide]      Headaches       PAST MEDICAL HISTORY:  Past Medical History:   Diagnosis Date     Chest pain 5/26/2015     Dissection of coronary artery      Hypertension      NSTEMI (non-ST elevated myocardial infarction) (H) 5/2015       PAST SURGICAL HISTORY:  Past Surgical History:   Procedure Laterality Date     CORONARY ANGIOGRAPHY ADULT ORDER  5/2015    coronary artery dissection       FAMILY HISTORY:  Family History   Problem Relation Age of Onset     Hypertension Father        SOCIAL HISTORY:  Social History     Social History     Marital status: Single     Spouse name: N/A     Number of children: N/A     Years of education: N/A     Social History Main Topics     Smoking status: Never Smoker     Smokeless tobacco: Never Used     Alcohol use Yes      Comment: wine occasionally      Drug use: No     Sexual activity: Yes     Partners: Male     Other Topics Concern     Caffeine Concern No     not currently     Sleep Concern No     most of the time     Stress Concern No     Weight Concern No     Special Diet Yes     low sodium, low cholesterol, low fat     Exercise Yes     cardiac rehab      Seat Belt Yes     Social History Narrative       Review of Systems:  Skin:  Negative       Eyes:  Positive for glasses    ENT:  Positive for nasal congestion allergies  Respiratory:  Negative       Cardiovascular:  Negative      Gastroenterology: Negative      Genitourinary:  Negative      Musculoskeletal:  Negative      Neurologic:  Positive for numbness or tingling of hands occ  Psychiatric:  Negative      Heme/Lymph/Imm:  Positive for allergies seasonal  Endocrine:  Negative        Physical Exam:  Vitals: /87  Pulse 85  Wt 122.2 kg (269 lb 8 oz)  BMI 40.98 kg/m2    Constitutional:  cooperative, alert and oriented, well developed, well nourished,  in no acute distress        Skin:  warm and dry to the touch          Head:  normocephalic        Eyes:  pupils equal and round        Lymph:      ENT:  no pallor or cyanosis        Neck:  JVP normal;no carotid bruit        Respiratory:  normal breath sounds, clear to auscultation, normal A-P diameter, normal symmetry, normal respiratory excursion, no use of accessory muscles         Cardiac: regular rhythm;normal S1 and S2                                                         GI:  abdomen soft        Extremities and Muscular Skeletal:  no edema              Neurological:  no gross motor deficits        Psych:  Alert and Oriented x 3    Encounter Diagnoses   Name Primary?     Dissection of coronary artery      Mixed hyperlipidemia Yes     Benign essential hypertension      Acute seasonal allergic rhinitis, unspecified trigger      Acute myocardial infarction, initial episode of care      ACS (acute coronary syndrome) (H)        Recent Lab Results:  LIPID RESULTS:  Lab Results   Component Value Date    CHOL 98 12/18/2017    HDL 54 12/18/2017    LDL 36 12/18/2017    TRIG 38 12/18/2017    CHOLHDLRATIO 1.7 05/11/2015       LIVER ENZYME RESULTS:  Lab Results   Component Value Date    AST 18 05/10/2015    ALT 27 05/10/2015       CBC RESULTS:  Lab Results   Component Value Date    WBC 4.8 12/18/2017    RBC 4.46 12/18/2017    HGB 13.2 12/18/2017    HCT 40.2 12/18/2017    MCV 90 12/18/2017    MCH 29.5 12/18/2017    MCHC 32.7 12/18/2017    RDW 12.2 12/18/2017     12/18/2017       BMP RESULTS:  Lab Results   Component Value Date     12/18/2017    POTASSIUM 4.0 12/18/2017    CHLORIDE 107 12/18/2017    CO2 24 12/18/2017    ANIONGAP 12 12/18/2017    GLC 91 12/18/2017    BUN 10 12/18/2017    CR 0.82 12/18/2017    GFRESTIMATED >60 12/18/2017    GFRESTBLACK >60 12/18/2017    CORTES 9.0 12/18/2017        A1C RESULTS:  No results found for: A1C    INR RESULTS:  No results found for: INR    Thank you for allowing me to  participate in the care of your patient.    Sincerely,     YEISON Strickland [facility]

## 2018-05-16 NOTE — MR AVS SNAPSHOT
"              After Visit Summary   5/16/2018    Chula Palacios    MRN: [id number]           Patient Information     Date Of Birth          1965        Visit Information        Provider Department      5/16/2018 10:00 AM Shadia Sofia APRN CNP [facility]        Today's Diagnoses     Mixed hyperlipidemia    -  1    Dissection of coronary artery        Benign essential hypertension        Acute seasonal allergic rhinitis, unspecified trigger          Care Instructions    See your primary to talk about the \"incedental findings\" on the CT. Including the mass on the left breast and the lung nodules, the CT reader recommended mammogram and 3 month f/u CT of lung nodule. We can do that, but maybe your primary wants to manage it.    If your blood pressure is consistently over 130, and you cant get it down with diet/exercise, then I would increase the the losartan to 100 mg and then check labs and see me in follow up.    Ольга  [phone]          Follow-ups after your visit        Who to contact     If you have questions or need follow up information about today's clinic visit or your schedule please contact [facility] directly at [phone].  Normal or non-critical lab and imaging results will be communicated to you by MyChart, letter or phone within 4 business days after the clinic has received the results. If you do not hear from us within 7 days, please contact the clinic through [employer] The[employer] or phone. If you have a critical or abnormal lab result, we will notify you by phone as soon as possible.  Submit refill requests through OpenText or call your pharmacy and they will forward the refill request to us. Please allow 3 business days for your refill to be completed.          Additional Information About Your Visit        MyChart Information     OpenText lets you send messages to your doctor, view your test results, renew your " "prescriptions, schedule appointments and more. To sign up, go to [email or web address] . Click on \"Log in\" on the left side of the screen, which will take you to the Welcome page. Then click on \"Sign up Now\" on the right side of the page.     You will be asked to enter the access code listed below, as well as some personal information. Please follow the directions to create your username and password.     Your access code is: [email or web address]  Expires: 2018  1:12 PM     Your access code will  in 90 days. If you need help or a new code, please call your [place] clinic or [phone].        Care EveryWhere ID     This is your Care EveryWhere ID. This could be used by other organizations to access your [place] medical records  [id number]        Your Vitals Were     Pulse BMI (Body Mass Index)                85 40.98 kg/m2           Blood Pressure from Last 3 Encounters:   18 131/87   18 (!) 138/92   10/20/15 110/80    Weight from Last 3 Encounters:   18 122.2 kg (269 lb 8 oz)   18 123.5 kg (272 lb 3.2 oz)   10/20/15 113.4 kg (250 lb)              We Performed the Following     Follow-Up with Cardiac Advanced Practice Provider          Today's Medication Changes          These changes are accurate as of 18 10:52 AM.  If you have any questions, ask your nurse or doctor.               Start taking these medicines.        Dose/Directions    cetirizine HCl 10 MG Caps   Used for:  Acute seasonal allergic rhinitis, unspecified trigger   Started by:  Shadia Sofia APRN CNP        Dose:  10 mg   Take 10 mg by mouth daily as needed   Quantity:  30 capsule   Refills:  3            Where to get your medicines      These medications were sent to [facility] [address] [place] [address] [address] [place] [address]     Phone:  [phone]     cetirizine HCl 10 MG Caps                Primary Care Provider Office Phone # Fax #    [employer] " [facility] [phone]       [address] [place] [address]        Equal Access to Services     JACINTO ELIZABETH : Hadii aad ku hadtiensamuel Quinali, wasofieda luqeusebioha, qaybta katrda lenpaulinomayte, jose mannyin hayaaerlin harringtonolvin melloestefanijimbo kenny. So [facility] [phone].    ATENCIÓN: Si habla español, tiene a kline disposición servicios gratuitos de asistencia lingüística. Yvette al [phone].    We comply with applicable federal civil rights laws and [place] laws. We do not discriminate on the basis of race, color, national origin, age, disability, sex, sexual orientation, or gender identity.            Thank you!     Thank you for choosing [facility]  for your care. Our goal is always to provide you with excellent care. Hearing back from our patients is one way we can continue to improve our services. Please take a few minutes to complete the written survey that you may receive in the mail after your visit with us. Thank you!             Your Updated Medication List - Protect others around you: Learn how to safely use, store and throw away your medicines at www.disposemymeds.org.          This list is accurate as of 5/16/18 10:52 AM.  Always use your most recent med list.                   Brand Name Dispense Instructions for use Diagnosis    amLODIPine 10 MG tablet    NORVASC    90 tablet    Take 1 tablet (10 mg) by mouth daily    Unspecified essential hypertension       aspirin 81 MG EC tablet      Take 1 tablet (81 mg) by mouth daily    Dissection of coronary artery, ACS (acute coronary syndrome) (H), Acute myocardial infarction, initial episode of care       atorvastatin 40 MG tablet    LIPITOR    30 tablet    Take 1 tablet (40 mg) by mouth daily    Dissection of coronary artery, Acute myocardial infarction, initial episode of care       cetirizine HCl 10 MG Caps     30 capsule    Take 10 mg by mouth daily as needed    Acute seasonal allergic rhinitis, unspecified trigger        losartan 50 MG tablet    COZAAR    90 tablet    Take 1 tablet (50 mg) by mouth daily    ACS (acute coronary syndrome) (H)       metoprolol tartrate 100 MG tablet    LOPRESSOR    60 tablet    Take 1 tablet (100 mg) by mouth 2 times daily    Dissection of coronary artery, Acute myocardial infarction, initial episode of care, ACS (acute coronary syndrome) (H)       nitroGLYcerin 0.4 MG sublingual tablet    NITROSTAT    25 tablet    Place 1 tablet (0.4 mg) under the tongue every 5 minutes as needed for chest pain (Maximum 3 doses in 15 minutes, if needed.)    Dissection of coronary artery, Acute myocardial infarction, initial episode of care, ACS (acute coronary syndrome) (H)       Potassium Chloride ER 20 MEQ Tbcr     90 tablet    Take 1 tablet (20 mEq) by mouth daily    ACS (acute coronary syndrome) (H)

## 2018-05-16 NOTE — PROGRESS NOTES
HPI and Plan:   I had the pleasure of seeing Chula Palacios today in cardiology clinic follow up. She is a pleasant 52 year old patient who recently started seeing Dr. Munroe. Ms. 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  She continued on Brilinta until her visit with Dr. Munroe last month.  She does not have a family history of  premature coronary artery disease, she is a non-smoker.    Dr. Munroe recommended a CT to exclude FMD, and the stress test to look for any evidence of ischemia she had some pressure a few weeks prior under her breastbone which was worse with deep inspiration and better with rest.  Her stress echocardiogram showed a normal EF of 55-60%, there was a small inferior apical region of akinesis, no new stress-induced wall motion abnormalities and no ischemia sepsis suspected.  EKG G portion of the stress test was negative.  She did have a borderline hypertensive response to exercise with a peak heart rate of correction a peak blood pressure of 190/100.  She had no significant valvular disease, though she did have a hint of mild pulmonary hypertension with RVSP of 31+ right atrial pressure.  She had no symptoms during her stress test and was able to reach a target heart rate.    Her CT angiography was negative for the head and neck.  However there were incidental findings on the radiology report including a 1.4 cm soft tissue and tissue nodule in the upper outer quadrant of the left breast which was recommended to be evaluated by mammography.  In addition to this it was noted that she had two pulmonary nodules, the largest measuring 9 mm, the radiologist recommended a 3 month follow-up CT.     Dr. Munroe actually ordered a mammogram and follow-up CT, however I learned today that she has had mammograms done previously in the [facility] system, there was some abnormalities on them in the past  requiring further evaluation and they were compared to a mammogram she had done in [place].    Physical Exam  Please see Below     Assessment and Plan  1.  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  She was recently discontinued from Brilinta.  She continues on a low-dose aspirin.  She continues on statin therapy.  Her blood pressure is borderline controlled.  We discussed possibly increasing her losartan 200 mg daily.  She was not very excited about increasing her blood pressure medicine.  We discussed other options including more regular  exercise and weight loss, she would like to start with this.  She is asymptomatic.  Her stress echocardiogram did not demonstrate any new areas of ischemia.  She should continue on good medical management with beta-blocker, ARB, statin and aspirin.  2.  Hypertension.  As I said above, she had a borderline hypertensive response to exercise.  Her blood pressure in clinic is borderline.  She has gained about 20 pounds in the last year and a half.  She would like to try diet and exercise before increasing her losartan.  If her blood pressure continues to remain elevated with a systolic blood pressure over 130, then I would double her losartan and have her repeat a BMP.  Dr. Munroe wants to see her in August  3. Incidental findings.  She did have 2 incidental findings on her CT, both of which we discussed today.  She wonders, since she had her mammogram done at [facility] in December, if she needs to repeat this, she did have an ultrasound done of the left breast in 2016, she has known macrolobulated nodule that was stable in 2016.  Her mammogram in December 2017 did not describe the nodule. After our visit I called her primary, Dr. Joseph Chaudhari and discussed the breast and lung nodules. He asked that she arrange follow up with him for both concerns.    Thank you for allowing me to care for Chula Palacios today, she is scheduled to see Dr. Munroe again in  September.    YEISON Das, CNP  Cardiology    Voice recognition software was used for this note, I have reviewed this note, but errors may have been missed.    No orders of the defined types were placed in this encounter.    Orders Placed This Encounter   Medications     cetirizine HCl 10 MG CAPS     Sig: Take 10 mg by mouth daily as needed     Dispense:  30 capsule     Refill:  3     nitroGLYcerin (NITROSTAT) 0.4 MG sublingual tablet     Sig: Place 1 tablet (0.4 mg) under the tongue every 5 minutes as needed for chest pain (Maximum 3 doses in 15 minutes, if needed.)     Dispense:  25 tablet     Refill:  1     Medications Discontinued During This Encounter   Medication Reason     nitroglycerin (NITROSTAT) 0.4 MG SL tablet Reorder         CURRENT MEDICATIONS:  Current Outpatient Prescriptions   Medication Sig Dispense Refill     amLODIPine (NORVASC) 10 MG tablet Take 1 tablet (10 mg) by mouth daily 90 tablet 0     aspirin EC 81 MG EC tablet Take 1 tablet (81 mg) by mouth daily       atorvastatin (LIPITOR) 40 MG tablet Take 1 tablet (40 mg) by mouth daily 30 tablet 12     cetirizine HCl 10 MG CAPS Take 10 mg by mouth daily as needed 30 capsule 3     losartan (COZAAR) 50 MG tablet Take 1 tablet (50 mg) by mouth daily 90 tablet 0     metoprolol (LOPRESSOR) 100 MG tablet Take 1 tablet (100 mg) by mouth 2 times daily 60 tablet 12     nitroGLYcerin (NITROSTAT) 0.4 MG sublingual tablet Place 1 tablet (0.4 mg) under the tongue every 5 minutes as needed for chest pain (Maximum 3 doses in 15 minutes, if needed.) 25 tablet 1     Potassium Chloride ER 20 MEQ TBCR Take 1 tablet (20 mEq) by mouth daily 90 tablet 4     [DISCONTINUED] nitroglycerin (NITROSTAT) 0.4 MG SL tablet Place 1 tablet (0.4 mg) under the tongue every 5 minutes as needed for chest pain (Maximum 3 doses in 15 minutes, if needed.) 25 tablet 1       ALLERGIES     Allergies   Allergen Reactions     Imdur [Isosorbide]      Headaches       PAST MEDICAL  HISTORY:  Past Medical History:   Diagnosis Date     Chest pain 5/26/2015     Dissection of coronary artery      Hypertension      NSTEMI (non-ST elevated myocardial infarction) (H) 5/2015       PAST SURGICAL HISTORY:  Past Surgical History:   Procedure Laterality Date     CORONARY ANGIOGRAPHY ADULT ORDER  5/2015    coronary artery dissection       FAMILY HISTORY:  Family History   Problem Relation Age of Onset     Hypertension Father        SOCIAL HISTORY:  Social History     Social History     Marital status: Single     Spouse name: N/A     Number of children: N/A     Years of education: N/A     Social History Main Topics     Smoking status: Never Smoker     Smokeless tobacco: Never Used     Alcohol use Yes      Comment: wine occasionally      Drug use: No     Sexual activity: Yes     Partners: Male     Other Topics Concern     Caffeine Concern No     not currently     Sleep Concern No     most of the time     Stress Concern No     Weight Concern No     Special Diet Yes     low sodium, low cholesterol, low fat     Exercise Yes     cardiac rehab      Seat Belt Yes     Social History Narrative       Review of Systems:  Skin:  Negative       Eyes:  Positive for glasses    ENT:  Positive for nasal congestion allergies  Respiratory:  Negative       Cardiovascular:  Negative      Gastroenterology: Negative      Genitourinary:  Negative      Musculoskeletal:  Negative      Neurologic:  Positive for numbness or tingling of hands occ  Psychiatric:  Negative      Heme/Lymph/Imm:  Positive for allergies seasonal  Endocrine:  Negative        Physical Exam:  Vitals: /87  Pulse 85  Wt 122.2 kg (269 lb 8 oz)  BMI 40.98 kg/m2    Constitutional:  cooperative, alert and oriented, well developed, well nourished, in no acute distress        Skin:  warm and dry to the touch          Head:  normocephalic        Eyes:  pupils equal and round        Lymph:      ENT:  no pallor or cyanosis        Neck:  JVP normal;no carotid  bruit        Respiratory:  normal breath sounds, clear to auscultation, normal A-P diameter, normal symmetry, normal respiratory excursion, no use of accessory muscles         Cardiac: regular rhythm;normal S1 and S2                                                         GI:  abdomen soft        Extremities and Muscular Skeletal:  no edema              Neurological:  no gross motor deficits        Psych:  Alert and Oriented x 3    Encounter Diagnoses   Name Primary?     Dissection of coronary artery      Mixed hyperlipidemia Yes     Benign essential hypertension      Acute seasonal allergic rhinitis, unspecified trigger      Acute myocardial infarction, initial episode of care      ACS (acute coronary syndrome) (H)        Recent Lab Results:  LIPID RESULTS:  Lab Results   Component Value Date    CHOL 98 12/18/2017    HDL 54 12/18/2017    LDL 36 12/18/2017    TRIG 38 12/18/2017    CHOLHDLRATIO 1.7 05/11/2015       LIVER ENZYME RESULTS:  Lab Results   Component Value Date    AST 18 05/10/2015    ALT 27 05/10/2015       CBC RESULTS:  Lab Results   Component Value Date    WBC 4.8 12/18/2017    RBC 4.46 12/18/2017    HGB 13.2 12/18/2017    HCT 40.2 12/18/2017    MCV 90 12/18/2017    MCH 29.5 12/18/2017    MCHC 32.7 12/18/2017    RDW 12.2 12/18/2017     12/18/2017       BMP RESULTS:  Lab Results   Component Value Date     12/18/2017    POTASSIUM 4.0 12/18/2017    CHLORIDE 107 12/18/2017    CO2 24 12/18/2017    ANIONGAP 12 12/18/2017    GLC 91 12/18/2017    BUN 10 12/18/2017    CR 0.82 12/18/2017    GFRESTIMATED >60 12/18/2017    GFRESTBLACK >60 12/18/2017    CORTES 9.0 12/18/2017        A1C RESULTS:  No results found for: A1C    INR RESULTS:  No results found for: INR        CC  Lilly Munroe MD  [address] [address]  [place] [id number]

## 2018-05-16 NOTE — PATIENT INSTRUCTIONS
"See your primary to talk about the \"incedental findings\" on the CT. Including the mass on the left breast and the lung nodules, the CT reader recommended mammogram and 3 month f/u CT of lung nodule. We can do that, but maybe your primary wants to manage it.    If your blood pressure is consistently over 130, and you cant get it down with diet/exercise, then I would increase the the losartan to 100 mg and then check labs and see me in follow up.    Ольга  [phone]  "

## 2018-06-04 ENCOUNTER — OFFICE VISIT - HEALTHEAST (OUTPATIENT)
Dept: FAMILY MEDICINE | Facility: CLINIC | Age: 53
End: 2018-06-04

## 2018-06-04 DIAGNOSIS — N63.0 BREAST NODULE: ICD-10-CM

## 2018-06-04 DIAGNOSIS — R91.1 LUNG NODULE: ICD-10-CM

## 2018-06-04 DIAGNOSIS — I10 ESSENTIAL HYPERTENSION: ICD-10-CM

## 2018-06-04 DIAGNOSIS — I25.10 CORONARY ARTERY DISEASE INVOLVING NATIVE CORONARY ARTERY: ICD-10-CM

## 2018-07-09 ENCOUNTER — COMMUNICATION - HEALTHEAST (OUTPATIENT)
Dept: FAMILY MEDICINE | Facility: CLINIC | Age: 53
End: 2018-07-09

## 2018-07-09 DIAGNOSIS — E87.6 HYPOKALEMIA: ICD-10-CM

## 2018-08-10 ENCOUNTER — HOSPITAL ENCOUNTER (OUTPATIENT)
Dept: CT IMAGING | Facility: CLINIC | Age: 53
Discharge: HOME | End: 2018-08-10
Attending: FAMILY MEDICINE

## 2018-08-10 ENCOUNTER — COMMUNICATION - HEALTHEAST (OUTPATIENT)
Dept: FAMILY MEDICINE | Facility: CLINIC | Age: 53
End: 2018-08-10

## 2018-08-10 DIAGNOSIS — R91.1 LUNG NODULE: ICD-10-CM

## 2018-09-06 ENCOUNTER — RECORDS - HEALTHEAST (OUTPATIENT)
Dept: ADMINISTRATIVE | Facility: OTHER | Age: 53
End: 2018-09-06

## 2018-09-06 ENCOUNTER — OFFICE VISIT (OUTPATIENT)
Dept: CARDIOLOGY | Facility: CLINIC | Age: 53
End: 2018-09-06
Attending: INTERNAL MEDICINE
Payer: COMMERCIAL

## 2018-09-06 VITALS
SYSTOLIC BLOOD PRESSURE: 134 MMHG | WEIGHT: 267.8 LBS | HEIGHT: 68 IN | HEART RATE: 72 BPM | DIASTOLIC BLOOD PRESSURE: 85 MMHG | BODY MASS INDEX: 40.59 KG/M2

## 2018-09-06 DIAGNOSIS — E78.2 MIXED HYPERLIPIDEMIA: ICD-10-CM

## 2018-09-06 DIAGNOSIS — R91.8 PULMONARY NODULES: ICD-10-CM

## 2018-09-06 DIAGNOSIS — I25.42 SPONTANEOUS DISSECTION OF CORONARY ARTERY: Primary | ICD-10-CM

## 2018-09-06 DIAGNOSIS — I10 BENIGN ESSENTIAL HYPERTENSION: ICD-10-CM

## 2018-09-06 DIAGNOSIS — R21 RASH: ICD-10-CM

## 2018-09-06 PROCEDURE — 99213 OFFICE O/P EST LOW 20 MIN: CPT | Performed by: INTERNAL MEDICINE

## 2018-09-06 NOTE — LETTER
9/6/2018      Joseph LILIAN Chaudhari MD  [facility] [address] [place] [id number]      RE: Chula Palacios       Dear Colleague,    I had the pleasure of seeing Chula Plaacios in the [facility].    Service Date: 09/06/2018      HISTORY OF PRESENT ILLNESS:  It was a pleasure to see Ms. Palacios in followup clinically.  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  I saw her in 04/2018, and she was still on Brilinta, which we stopped, and we talked about blood pressure control and working on diet and exercise.  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  She has 3 grown children, 2 of which are in college.  She has no tobacco use.      We did discuss when I saw her working her up for fibromuscular dysplasia.  She did undergo a CT of the chest, abdomen and pelvis.  They did not notice any evidence of FMD, but they did note some incidental findings, a nodule 9mm in the right upper lobe on 05/10/2018.  She did have a subsequent evaluation by [facility] with a CT and followup for 3 months, and it showed no change.  She also had a breast nodule.  Apparently, that is not new per her report.  She tried to get a mammogram just to follow up on that, and they reported that she was not due until December and would not be willing to do it before then, so she is going to get her mammogram as planned in December.  She has a history of hypertension.  It is not under ideal control.  It tends to be in the 130s, it sounds like.  She is not sure if her blood pressure cuff is calibrated.  As noted, she is working on diet and exercise and actually hopes to get better control through this.  She is on Norvasc 10 and Cozaar 50 and at this point does not want to go up on her medication.  She has a rash on  her left upper arm that was pruritic initially, but the skin was not broken.  It does not look like a dermatome istribution.  She reports this is the second time she has had a sporadic rash like this and is wondering about seeing somebody.  She did undergo a stress test earlier this year, and that looked good without any evidence of ischemia.      IMPRESSION, REPORT AND PLAN:   1.  History of spontaneous early coronary artery dissection in the mid LAD, managed medically back in 2015 with negative evaluation for FMD.   2.  Hypertension.   3.  Hyperlipidemia.   4.  Obesity.   5.  Incidental lung nodule, 9 mm, right upper lobe.   6.  Breast nodule followed at [facility].      DISCUSSION:  It was a pleasure to see Ms. Palacios in followup.  Clinically, she is doing well from a cardiovascular standpoint with no concerning cardiovascular symptoms.  We discussed her blood pressure.  She is going to check at home and bring her blood pressure cuff in to be calibrated.  She would like another 3 months of working on diet and exercise before we up her Cozaar.  I am going to have her see Ольга at that time.  She will bring her blood pressure cuff in, too, and we can determine then whether to increase it.  With regard to the rash, I am sending her to Dermatology.  The lung nodule we will follow up on again in a year.  If stable, I do not think she will need followup beyond that if she is low risk, and then she needs her mammogram, which she will have done in December.  She understands and is in agreement with the plan as outlined.  We will plan to see her as outlined.        It was a pleasure to see her.  Please do not hesitate to contact me with any questions or concerns.         ALANNAH CUNNINGHAM MD             D: 2018   T: 2018   MT: vanna      Name:     CARI PALACIOS   MRN:      [id number]        Account:      [id number]   :      1965           Service Date: 2018      Document: [id number]            Outpatient Encounter Prescriptions as of 9/6/2018   Medication Sig Dispense Refill     amLODIPine (NORVASC) 10 MG tablet Take 1 tablet (10 mg) by mouth daily 90 tablet 0     aspirin EC 81 MG EC tablet Take 1 tablet (81 mg) by mouth daily       atorvastatin (LIPITOR) 40 MG tablet Take 1 tablet (40 mg) by mouth daily 30 tablet 12     cetirizine HCl 10 MG CAPS Take 10 mg by mouth daily as needed 30 capsule 3     losartan (COZAAR) 50 MG tablet Take 1 tablet (50 mg) by mouth daily 90 tablet 0     metoprolol (LOPRESSOR) 100 MG tablet Take 1 tablet (100 mg) by mouth 2 times daily 60 tablet 12     nitroGLYcerin (NITROSTAT) 0.4 MG sublingual tablet Place 1 tablet (0.4 mg) under the tongue every 5 minutes as needed for chest pain (Maximum 3 doses in 15 minutes, if needed.) 25 tablet 1     Potassium Chloride ER 20 MEQ TBCR Take 1 tablet (20 mEq) by mouth daily 90 tablet 4     No facility-administered encounter medications on file as of 9/6/2018.        Again, thank you for allowing me to participate in the care of your patient.      Sincerely,    Lilly Munroe MD     [facility]

## 2018-09-06 NOTE — MR AVS SNAPSHOT
After Visit Summary   9/6/2018    Chula Palacios    MRN: [id number]           Patient Information     Date Of Birth          1965        Visit Information        Provider Department      9/6/2018 10:15 AM March, Lilly Bledsoe MD [facility]   [place]        Today's Diagnoses     Spontaneous dissection of coronary artery    -  1    Benign essential hypertension        Mixed hyperlipidemia        Rash        Pulmonary nodules           Follow-ups after your visit        Additional Services     DERMATOLOGY REFERRAL       Your provider has referred you to:   Please be aware that coverage of these services is subject to the terms and limitations of your health insurance plan.  Call member services at your health plan with any benefit or coverage questions.      Please bring the following with you to your appointment:    (1) Any X-Rays, CTs or MRIs which have been performed.  Contact the facility where they were done to arrange for  prior to your scheduled appointment.    (2) List of current medications  (3) This referral request   (4) Any documents/labs given to you for this referral            Follow-Up with Cardiac Advanced Practice Provider           Follow-Up with Cardiologist                 Future tests that were ordered for you today     Open Future Orders        Priority Expected Expires Ordered    CT Chest w/o Contrast Routine 9/6/2019 9/6/2019 9/6/2018    Lipid Profile Routine 9/6/2019 9/6/2019 9/6/2018    ALT Routine 9/6/2019 9/6/2019 9/6/2018    Follow-Up with Cardiologist Routine 9/6/2019 9/7/2019 9/6/2018    Follow-Up with Cardiac Advanced Practice Provider Routine 12/5/2018 9/6/2019 9/6/2018            Who to contact     If you have questions or need follow up information about today's clinic visit or your schedule please contact [facility]   LENNIE directly at [phone].  Normal or non-critical lab and imaging results  "will be communicated to you by MyChart, letter or phone within 4 business days after the clinic has received the results. If you do not hear from us within 7 days, please contact the clinic through [employer] or phone. If you have a critical or abnormal lab result, we will notify you by phone as soon as possible.  Submit refill requests through [employer] or call your pharmacy and they will forward the refill request to us. Please allow 3 business days for your refill to be completed.          Additional Information About Your Visit        [employer] Information     [employer] lets you send messages to your doctor, view your test results, renew your prescriptions, schedule appointments and more. To sign up, go to [email or web address] . Click on \"Log in\" on the left side of the screen, which will take you to the Welcome page. Then click on \"Sign up Now\" on the right side of the page.     You will be asked to enter the access code listed below, as well as some personal information. Please follow the directions to create your username and password.     Your access code is: [email or web address]  Expires: 2018 10:45 AM     Your access code will  in 90 days. If you need help or a new code, please call your [facility] clinic or [phone].        Care EveryWhere ID     This is your Care EveryWhere ID. This could be used by other organizations to access your [facility] medical records  [id number]        Your Vitals Were     Pulse Height BMI (Body Mass Index)             72 1.727 m (5' 8\") 40.72 kg/m2          Blood Pressure from Last 3 Encounters:   18 134/85   18 131/87   18 (!) 138/92    Weight from Last 3 Encounters:   18 121.5 kg (267 lb 12.8 oz)   18 122.2 kg (269 lb 8 oz)   18 123.5 kg (272 lb 3.2 oz)              We Performed the Following     DERMATOLOGY REFERRAL     Follow-Up with Cardiologist        Primary Care Provider Office Phone # Fax #    Joseph Chaudhari [phone] "       [address] [address]        Equal Access to Services     JACINTO ELIZABETH : Hadii jamilah Engle, padmaja barr, emi arias, jose kenny. So [facility] [phone].    ATENCIÓN: Si habla español, tiene a kline disposición servicios gratuitos de asistencia lingüística. Desmondame al [phone].    We comply with applicable federal civil rights laws and [place] laws. We do not discriminate on the basis of race, color, national origin, age, disability, sex, sexual orientation, or gender identity.            Thank you!     Thank you for choosing [facility]  for your care. Our goal is always to provide you with excellent care. Hearing back from our patients is one way we can continue to improve our services. Please take a few minutes to complete the written survey that you may receive in the mail after your visit with us. Thank you!             Your Updated Medication List - Protect others around you: Learn how to safely use, store and throw away your medicines at www.disposemymeds.org.          This list is accurate as of 9/6/18 10:47 AM.  Always use your most recent med list.                   Brand Name Dispense Instructions for use Diagnosis    amLODIPine 10 MG tablet    NORVASC    90 tablet    Take 1 tablet (10 mg) by mouth daily    Unspecified essential hypertension       aspirin 81 MG EC tablet      Take 1 tablet (81 mg) by mouth daily    Dissection of coronary artery, ACS (acute coronary syndrome) (H), Acute myocardial infarction, initial episode of care       atorvastatin 40 MG tablet    LIPITOR    30 tablet    Take 1 tablet (40 mg) by mouth daily    Dissection of coronary artery, Acute myocardial infarction, initial episode of care       cetirizine HCl 10 MG Caps     30 capsule    Take 10 mg by mouth daily as needed    Acute seasonal allergic rhinitis, unspecified trigger       losartan 50 MG  tablet    COZAAR    90 tablet    Take 1 tablet (50 mg) by mouth daily    ACS (acute coronary syndrome) (H)       metoprolol tartrate 100 MG tablet    LOPRESSOR    60 tablet    Take 1 tablet (100 mg) by mouth 2 times daily    Dissection of coronary artery, Acute myocardial infarction, initial episode of care, ACS (acute coronary syndrome) (H)       nitroGLYcerin 0.4 MG sublingual tablet    NITROSTAT    25 tablet    Place 1 tablet (0.4 mg) under the tongue every 5 minutes as needed for chest pain (Maximum 3 doses in 15 minutes, if needed.)    Dissection of coronary artery, Acute myocardial infarction, initial episode of care, ACS (acute coronary syndrome) (H)       Potassium Chloride ER 20 MEQ Tbcr     90 tablet    Take 1 tablet (20 mEq) by mouth daily    ACS (acute coronary syndrome) (H)

## 2018-09-06 NOTE — LETTER
9/6/2018    Joseph LILIAN Chaudhari MD  [address] [address]    RE: Chula Palacios       Dear Colleague,    I had the pleasure of seeing Chula Palacios in the [facility].    HPI:     Please see dictated note    PAST MEDICAL HISTORY:  Past Medical History:   Diagnosis Date     Chest pain 5/26/2015     Dissection of coronary artery      Hypertension      NSTEMI (non-ST elevated myocardial infarction) (H) 5/2015       CURRENT MEDICATIONS:  Current Outpatient Prescriptions   Medication Sig Dispense Refill     amLODIPine (NORVASC) 10 MG tablet Take 1 tablet (10 mg) by mouth daily 90 tablet 0     aspirin EC 81 MG EC tablet Take 1 tablet (81 mg) by mouth daily       atorvastatin (LIPITOR) 40 MG tablet Take 1 tablet (40 mg) by mouth daily 30 tablet 12     cetirizine HCl 10 MG CAPS Take 10 mg by mouth daily as needed 30 capsule 3     losartan (COZAAR) 50 MG tablet Take 1 tablet (50 mg) by mouth daily 90 tablet 0     metoprolol (LOPRESSOR) 100 MG tablet Take 1 tablet (100 mg) by mouth 2 times daily 60 tablet 12     nitroGLYcerin (NITROSTAT) 0.4 MG sublingual tablet Place 1 tablet (0.4 mg) under the tongue every 5 minutes as needed for chest pain (Maximum 3 doses in 15 minutes, if needed.) 25 tablet 1     Potassium Chloride ER 20 MEQ TBCR Take 1 tablet (20 mEq) by mouth daily 90 tablet 4       PAST SURGICAL HISTORY:  Past Surgical History:   Procedure Laterality Date     CORONARY ANGIOGRAPHY ADULT ORDER  5/2015    coronary artery dissection       ALLERGIES     Allergies   Allergen Reactions     Imdur [Isosorbide]      Headaches       FAMILY HISTORY:  Family History   Problem Relation Age of Onset     Hypertension Father        SOCIAL HISTORY:  Social History     Social History     Marital status: Single     Spouse name: N/A     Number of children: N/A     Years of education: N/A     Social History Main Topics     Smoking status: Never Smoker     Smokeless tobacco: Never Used      "Alcohol use Yes      Comment: wine occasionally      Drug use: No     Sexual activity: Yes     Partners: Male     Other Topics Concern     Caffeine Concern No     not currently     Sleep Concern No     most of the time     Stress Concern No     Weight Concern No     Special Diet Yes     low sodium, low cholesterol, low fat     Exercise Yes     cardiac rehab      Seat Belt Yes     Social History Narrative       ROS:   Constitutional: No fever, chills, or sweats. No weight gain/loss   ENT: No visual disturbance, ear ache, epistaxis, sore throat  Allergies/Immunologic: Negative.   Respiratory: No cough, hemoptysia  Cardiovascular: As per HPI  GI: No nausea, vomiting, hematemesis, melena, or hematochezia  : No urinary frequency, dysuria, or hematuria  Integument: Negative  Psychiatric: Negative  Neuro: Negative  Endocrinology: Negative   Musculoskeletal: Negative    EXAM:  /85  Pulse 72  Ht 1.727 m (5' 8\")  Wt 121.5 kg (267 lb 12.8 oz)  BMI 40.72 kg/m2  In general, the patient is a pleasant female in no apparent distress.    HEENT: NC/AT.  PERRLA.  EOMI.  Sclerae white, not injected.  Nares clear.  Pharynx without erythema or exudate.  Dentition intact.    Neck: No adenopathy.  No thyromegaly. Carotids +4/4 bilaterally without bruits.  No jugular venous distension.   Heart: RRR. Normal S1, S2 splits physiologically. No murmur, rub, click, or gallop.   Lungs: CTA.  No ronchi, wheezes, rales.   Abdomen: Soft, nontender, nondistended.  Extremities: No clubbing, cyanosis, or edema.  Neurologic: Alert and oriented to person/place/time, normal speech, gait and affect  Skin: No petechiae, purpura or rash.    Labs:  LIPID RESULTS:  Lab Results   Component Value Date    CHOL 98 12/18/2017    HDL 54 12/18/2017    LDL 36 12/18/2017    TRIG 38 12/18/2017    CHOLHDLRATIO 1.7 05/11/2015       LIVER ENZYME RESULTS:  Lab Results   Component Value Date    AST 18 05/10/2015    ALT 27 05/10/2015       CBC RESULTS:  Lab Results "   Component Value Date    WBC 4.8 12/18/2017    RBC 4.46 12/18/2017    HGB 13.2 12/18/2017    HCT 40.2 12/18/2017    MCV 90 12/18/2017    MCH 29.5 12/18/2017    MCHC 32.7 12/18/2017    RDW 12.2 12/18/2017     12/18/2017       BMP RESULTS:  Lab Results   Component Value Date     12/18/2017    POTASSIUM 4.0 12/18/2017    CHLORIDE 107 12/18/2017    CO2 24 12/18/2017    ANIONGAP 12 12/18/2017    GLC 91 12/18/2017    BUN 10 12/18/2017    CR 0.82 12/18/2017    GFRESTIMATED >60 12/18/2017    GFRESTBLACK >60 12/18/2017    CORTES 9.0 12/18/2017        GRAY Munroe MD     CC  Patient Care Team:  Joseph Chaudhari MD as PCP - General (Family Practice)  ALANNAH MUNROE    Service Date: 09/06/2018      HISTORY OF PRESENT ILLNESS:  It was a pleasure to see Ms. Palacios in followup clinically.  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  I saw her in 04/2018, and she was still on Brilinta, which we stopped, and we talked about blood pressure control and working on diet and exercise.  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  She has 3 grown children, 2 of which are in college.  She has no tobacco use.      We did discuss when I saw her working her up for fibromuscular dysplasia.  She did undergo a CT of the chest, abdomen and pelvis.  They did not notice any evidence of FMD, but they did note some incidental findings, a nodule 9mm in the right upper lobe on 05/10/2018.  She did have a subsequent evaluation by [facility] with a CT and followup for 3 months, and it showed no change.  She also had a breast nodule.  Apparently, that is not new per her report.  She tried to get a mammogram just to follow up on that, and they reported that she was not due until December and would not be willing to do it  before then, so she is going to get her mammogram as planned in December.  She has a history of hypertension.  It is not under ideal control.  It tends to be in the 130s, it sounds like.  She is not sure if her blood pressure cuff is calibrated.  As noted, she is working on diet and exercise and actually hopes to get better control through this.  She is on Norvasc 10 and Cozaar 50 and at this point does not want to go up on her medication.  She has a rash on her left upper arm that was pruritic initially, but the skin was not broken.  It does not look like a dermatome istribution.  She reports this is the second time she has had a sporadic rash like this and is wondering about seeing somebody.  She did undergo a stress test earlier this year, and that looked good without any evidence of ischemia.      IMPRESSION, REPORT AND PLAN:   1.  History of spontaneous early coronary artery dissection in the mid LAD, managed medically back in 05/2015 with negative evaluation for FMD.   2.  Hypertension.   3.  Hyperlipidemia.   4.  Obesity.   5.  Incidental lung nodule, 9 mm, right upper lobe.   6.  Breast nodule followed at [facility].      DISCUSSION:  It was a pleasure to see Ms. Palacios in followup.  Clinically, she is doing well from a cardiovascular standpoint with no concerning cardiovascular symptoms.  We discussed her blood pressure.  She is going to check at home and bring her blood pressure cuff in to be calibrated.  She would like another 3 months of working on diet and exercise before we up her Cozaar.  I am going to have her see Ольга at that time.  She will bring her blood pressure cuff in, too, and we can determine then whether to increase it.  With regard to the rash, I am sending her to Dermatology.  The lung nodule we will follow up on again in a year.  If stable, I do not think she will need followup beyond that if she is low risk, and then she needs her mammogram, which she will have done in December.  She  understands and is in agreement with the plan as outlined.  We will plan to see her as outlined.        It was a pleasure to see her.  Please do not hesitate to contact me with any questions or concerns.         LILLY CUNNINGHAM MD             D: 2018   T: 2018   MT: vanna      Name:     CARI DE LA ROSA   MRN:      [id number]        Account:      [id number]   :      1965           Service Date: 2018      Document: [id number]        Thank you for allowing me to participate in the care of your patient.      Sincerely,     Lilly Cunningham MD     [facility]    cc:   Lilly Cunningham MD  [address] [facility] [address] [place] [address] [place] [id number]

## 2018-09-06 NOTE — PROGRESS NOTES
Service Date: 09/06/2018      HISTORY OF PRESENT ILLNESS:  It was a pleasure to see Ms. Palacios in followup clinically.  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  I saw her in 04/2018, and she was still on Brilinta, which we stopped, and we talked about blood pressure control and working on diet and exercise.  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  She has 3 grown children, 2 of which are in college.  She has no tobacco use.      We did discuss when I saw her working her up for fibromuscular dysplasia.  She did undergo a CT of the chest, abdomen and pelvis.  They did not notice any evidence of FMD, but they did note some incidental findings, a nodule 9mm in the right upper lobe on 05/10/2018.  She did have a subsequent evaluation by [facility] with a CT and followup for 3 months, and it showed no change.  She also had a breast nodule.  Apparently, that is not new per her report.  She tried to get a mammogram just to follow up on that, and they reported that she was not due until December and would not be willing to do it before then, so she is going to get her mammogram as planned in December.  She has a history of hypertension.  It is not under ideal control.  It tends to be in the 130s, it sounds like.  She is not sure if her blood pressure cuff is calibrated.  As noted, she is working on diet and exercise and actually hopes to get better control through this.  She is on Norvasc 10 and Cozaar 50 and at this point does not want to go up on her medication.  She has a rash on her left upper arm that was pruritic initially, but the skin was not broken.  It does not look like a dermatome istribution.  She reports this is the second time she has had a sporadic rash like this and is wondering about  seeing somebody.  She did undergo a stress test earlier this year, and that looked good without any evidence of ischemia.      IMPRESSION, REPORT AND PLAN:   1.  History of spontaneous early coronary artery dissection in the mid LAD, managed medically back in 2015 with negative evaluation for FMD.   2.  Hypertension.   3.  Hyperlipidemia.   4.  Obesity.   5.  Incidental lung nodule, 9 mm, right upper lobe.   6.  Breast nodule followed at [facility].      DISCUSSION:  It was a pleasure to see Ms. Palacios in followup.  Clinically, she is doing well from a cardiovascular standpoint with no concerning cardiovascular symptoms.  We discussed her blood pressure.  She is going to check at home and bring her blood pressure cuff in to be calibrated.  She would like another 3 months of working on diet and exercise before we up her Cozaar.  I am going to have her see Ольга at that time.  She will bring her blood pressure cuff in, too, and we can determine then whether to increase it.  With regard to the rash, I am sending her to Dermatology.  The lung nodule we will follow up on again in a year.  If stable, I do not think she will need followup beyond that if she is low risk, and then she needs her mammogram, which she will have done in December.  She understands and is in agreement with the plan as outlined.  We will plan to see her as outlined.        It was a pleasure to see her.  Please do not hesitate to contact me with any questions or concerns.         ALANNAH CUNNINGHAM MD             D: 2018   T: 2018   MT: vanna      Name:     CARI PALACIOS   MRN:      [id number]        Account:      [id number]   :      1965           Service Date: 2018      Document: [id number]

## 2018-09-06 NOTE — PROGRESS NOTES
HPI:     Please see dictated note    PAST MEDICAL HISTORY:  Past Medical History:   Diagnosis Date     Chest pain 5/26/2015     Dissection of coronary artery      Hypertension      NSTEMI (non-ST elevated myocardial infarction) (H) 5/2015       CURRENT MEDICATIONS:  Current Outpatient Prescriptions   Medication Sig Dispense Refill     amLODIPine (NORVASC) 10 MG tablet Take 1 tablet (10 mg) by mouth daily 90 tablet 0     aspirin EC 81 MG EC tablet Take 1 tablet (81 mg) by mouth daily       atorvastatin (LIPITOR) 40 MG tablet Take 1 tablet (40 mg) by mouth daily 30 tablet 12     cetirizine HCl 10 MG CAPS Take 10 mg by mouth daily as needed 30 capsule 3     losartan (COZAAR) 50 MG tablet Take 1 tablet (50 mg) by mouth daily 90 tablet 0     metoprolol (LOPRESSOR) 100 MG tablet Take 1 tablet (100 mg) by mouth 2 times daily 60 tablet 12     nitroGLYcerin (NITROSTAT) 0.4 MG sublingual tablet Place 1 tablet (0.4 mg) under the tongue every 5 minutes as needed for chest pain (Maximum 3 doses in 15 minutes, if needed.) 25 tablet 1     Potassium Chloride ER 20 MEQ TBCR Take 1 tablet (20 mEq) by mouth daily 90 tablet 4       PAST SURGICAL HISTORY:  Past Surgical History:   Procedure Laterality Date     CORONARY ANGIOGRAPHY ADULT ORDER  5/2015    coronary artery dissection       ALLERGIES     Allergies   Allergen Reactions     Imdur [Isosorbide]      Headaches       FAMILY HISTORY:  Family History   Problem Relation Age of Onset     Hypertension Father        SOCIAL HISTORY:  Social History     Social History     Marital status: Single     Spouse name: N/A     Number of children: N/A     Years of education: N/A     Social History Main Topics     Smoking status: Never Smoker     Smokeless tobacco: Never Used     Alcohol use Yes      Comment: wine occasionally      Drug use: No     Sexual activity: Yes     Partners: Male     Other Topics Concern     Caffeine Concern No     not currently     Sleep Concern No     most of the time      "Stress Concern No     Weight Concern No     Special Diet Yes     low sodium, low cholesterol, low fat     Exercise Yes     cardiac rehab      Seat Belt Yes     Social History Narrative       ROS:   Constitutional: No fever, chills, or sweats. No weight gain/loss   ENT: No visual disturbance, ear ache, epistaxis, sore throat  Allergies/Immunologic: Negative.   Respiratory: No cough, hemoptysia  Cardiovascular: As per HPI  GI: No nausea, vomiting, hematemesis, melena, or hematochezia  : No urinary frequency, dysuria, or hematuria  Integument: Negative  Psychiatric: Negative  Neuro: Negative  Endocrinology: Negative   Musculoskeletal: Negative    EXAM:  /85  Pulse 72  Ht 1.727 m (5' 8\")  Wt 121.5 kg (267 lb 12.8 oz)  BMI 40.72 kg/m2  In general, the patient is a pleasant female in no apparent distress.    HEENT: NC/AT.  PERRLA.  EOMI.  Sclerae white, not injected.  Nares clear.  Pharynx without erythema or exudate.  Dentition intact.    Neck: No adenopathy.  No thyromegaly. Carotids +4/4 bilaterally without bruits.  No jugular venous distension.   Heart: RRR. Normal S1, S2 splits physiologically. No murmur, rub, click, or gallop.   Lungs: CTA.  No ronchi, wheezes, rales.   Abdomen: Soft, nontender, nondistended.  Extremities: No clubbing, cyanosis, or edema.  Neurologic: Alert and oriented to person/place/time, normal speech, gait and affect  Skin: No petechiae, purpura or rash.    Labs:  LIPID RESULTS:  Lab Results   Component Value Date    CHOL 98 12/18/2017    HDL 54 12/18/2017    LDL 36 12/18/2017    TRIG 38 12/18/2017    CHOLHDLRATIO 1.7 05/11/2015       LIVER ENZYME RESULTS:  Lab Results   Component Value Date    AST 18 05/10/2015    ALT 27 05/10/2015       CBC RESULTS:  Lab Results   Component Value Date    WBC 4.8 12/18/2017    RBC 4.46 12/18/2017    HGB 13.2 12/18/2017    HCT 40.2 12/18/2017    MCV 90 12/18/2017    MCH 29.5 12/18/2017    MCHC 32.7 12/18/2017    RDW 12.2 12/18/2017     " 12/18/2017       BMP RESULTS:  Lab Results   Component Value Date     12/18/2017    POTASSIUM 4.0 12/18/2017    CHLORIDE 107 12/18/2017    CO2 24 12/18/2017    ANIONGAP 12 12/18/2017    GLC 91 12/18/2017    BUN 10 12/18/2017    CR 0.82 12/18/2017    GFRESTIMATED >60 12/18/2017    GFRESTBLACK >60 12/18/2017    CORTES 9.0 12/18/2017        GRAY Munroe MD     CC  Patient Care Team:  Joseph Chaudhari MD as PCP - General (Family Practice)  ALANNAH MUNROE

## 2018-11-16 ENCOUNTER — RECORDS - HEALTHEAST (OUTPATIENT)
Dept: ADMINISTRATIVE | Facility: OTHER | Age: 53
End: 2018-11-16

## 2018-12-03 ENCOUNTER — OFFICE VISIT - HEALTHEAST (OUTPATIENT)
Dept: FAMILY MEDICINE | Facility: CLINIC | Age: 53
End: 2018-12-03

## 2018-12-03 DIAGNOSIS — E66.01 MORBID OBESITY (H): ICD-10-CM

## 2018-12-03 DIAGNOSIS — R10.32 ABDOMINAL PAIN, LEFT LOWER QUADRANT: ICD-10-CM

## 2018-12-03 DIAGNOSIS — I10 ESSENTIAL HYPERTENSION: ICD-10-CM

## 2018-12-03 DIAGNOSIS — E78.00 PURE HYPERCHOLESTEROLEMIA: ICD-10-CM

## 2018-12-03 ASSESSMENT — MIFFLIN-ST. JEOR: SCORE: 1843.47

## 2019-02-04 ENCOUNTER — COMMUNICATION - HEALTHEAST (OUTPATIENT)
Dept: FAMILY MEDICINE | Facility: CLINIC | Age: 54
End: 2019-02-04

## 2019-02-04 DIAGNOSIS — I10 HYPERTENSION: ICD-10-CM

## 2019-02-04 DIAGNOSIS — E87.6 HYPOKALEMIA: ICD-10-CM

## 2019-03-11 ENCOUNTER — COMMUNICATION - HEALTHEAST (OUTPATIENT)
Dept: FAMILY MEDICINE | Facility: CLINIC | Age: 54
End: 2019-03-11

## 2019-03-11 DIAGNOSIS — I10 HYPERTENSION: ICD-10-CM

## 2019-03-11 DIAGNOSIS — E78.00 PURE HYPERCHOLESTEROLEMIA: ICD-10-CM

## 2019-03-11 DIAGNOSIS — E87.6 HYPOKALEMIA: ICD-10-CM

## 2019-03-14 ENCOUNTER — OFFICE VISIT (OUTPATIENT)
Dept: FAMILY MEDICINE | Facility: CLINIC | Age: 54
End: 2019-03-14
Payer: COMMERCIAL

## 2019-03-14 VITALS — HEART RATE: 89 BPM | DIASTOLIC BLOOD PRESSURE: 90 MMHG | TEMPERATURE: 98.5 F | SYSTOLIC BLOOD PRESSURE: 137 MMHG

## 2019-03-14 DIAGNOSIS — Z71.84 TRAVEL ADVICE ENCOUNTER: Primary | ICD-10-CM

## 2019-03-14 PROCEDURE — 90734 MENACWYD/MENACWYCRM VACC IM: CPT | Mod: GA | Performed by: NURSE PRACTITIONER

## 2019-03-14 PROCEDURE — 90717 YELLOW FEVER VACCINE SUBQ: CPT | Mod: GA | Performed by: NURSE PRACTITIONER

## 2019-03-14 PROCEDURE — 90707 MMR VACCINE SC: CPT | Mod: GA | Performed by: NURSE PRACTITIONER

## 2019-03-14 PROCEDURE — 90713 POLIOVIRUS IPV SC/IM: CPT | Mod: GA | Performed by: NURSE PRACTITIONER

## 2019-03-14 PROCEDURE — 90472 IMMUNIZATION ADMIN EACH ADD: CPT | Mod: GA | Performed by: NURSE PRACTITIONER

## 2019-03-14 PROCEDURE — 90471 IMMUNIZATION ADMIN: CPT | Mod: GA | Performed by: NURSE PRACTITIONER

## 2019-03-14 PROCEDURE — 90691 TYPHOID VACCINE IM: CPT | Mod: GA | Performed by: NURSE PRACTITIONER

## 2019-03-14 PROCEDURE — 90715 TDAP VACCINE 7 YRS/> IM: CPT | Mod: GA | Performed by: NURSE PRACTITIONER

## 2019-03-14 PROCEDURE — 99402 PREV MED CNSL INDIV APPRX 30: CPT | Mod: 25 | Performed by: NURSE PRACTITIONER

## 2019-03-14 RX ORDER — ATOVAQUONE AND PROGUANIL HYDROCHLORIDE 250; 100 MG/1; MG/1
1 TABLET, FILM COATED ORAL DAILY
Qty: 30 TABLET | Refills: 0 | Status: SHIPPED | OUTPATIENT
Start: 2019-03-14

## 2019-03-14 RX ORDER — AZITHROMYCIN 500 MG/1
500 TABLET, FILM COATED ORAL DAILY
Qty: 3 TABLET | Refills: 0 | Status: SHIPPED | OUTPATIENT
Start: 2019-03-14 | End: 2019-03-17

## 2019-03-14 ASSESSMENT — PAIN SCALES - GENERAL: PAINLEVEL: NO PAIN (0)

## 2019-03-14 NOTE — PATIENT INSTRUCTIONS
Today March 14, 2019 you received the    Yellow Fever (YF)    Polio (IPV) Vaccine    Tetanus (Tdap) Vaccine    Meningococcal (Menactra) Vaccine    MMR (Measles Mumps Rubella) Vaccine    Typhoid - injectable. This vaccine is valid for two years.   .    These appointments can be made as a NURSE ONLY visit.    **It is very important for the vaccinations to be given on the scheduled day(s), this helps ensure you receive the full effectiveness of the vaccine.**    Please call [facility] with any questions [phone]    Thank you for visiting [facility]

## 2019-03-14 NOTE — NURSING NOTE
"Chief Complaint   Patient presents with     [facility]      /90   Pulse 89   Temp 98.5  F (36.9  C) (Oral)   LMP 02/19/2019 (Approximate)  Estimated body mass index is 40.72 kg/m  as calculated from the following:    Height as of 9/6/18: 1.727 m (5' 8\").    Weight as of 9/6/18: 121.5 kg (267 lb 12.8 oz).  bp completed using cuff size: large      Health Maintenance addressed:  NONE    n/a              "

## 2019-03-14 NOTE — PROGRESS NOTES
Nurse Note      Itinerary:  [facility] ([facility])      Departure Date: 04/14/2019      Return Date: 05/02/2019      Length of Trip 3 weeks       Reason for Travel: Tourism           Urban or rural: both      Accommodations: Family home        IMMUNIZATION HISTORY  Have you received any immunizations within the past 4 weeks?  No  Have you ever fainted from having your blood drawn or from an injection?  No  Have you ever had a fever reaction to vaccination?  No  Have you ever had any bad reaction or side effect from any vaccination?  No  Have you ever had hepatitis A or B vaccine?  Yes  Do you live (or work closely) with anyone who has AIDS, an AIDS-like condition, any other immune disorder or who is on chemotherapy for cancer?  No  Do you have a family history of immunodeficiency?  No  Have you received any injection of immune globulin or any blood products during the past 12 months?  No    Patient roomed by [employer]  Chula Palacios is a 53 year old female seen today alone for counsultation for international travel to [facility] for Visiting friends and relatives.  Patient will be departing in  1 month(s) and staying for   3 week(s) and  traveling alone.      Patient itinerary :  will be in the urban region of [facility] which presents risk for Malaria, Yellow Fever, Rabies, food borne illnesses and motor vehicle accidents. exposure.      Patient's activities will include visiting friends and relatives.    Patient's country of birth is [facility]    Special medical concerns: Cardiac history   Pre-travel questionnaire was completed by patient and reviewed by provider.     Vitals: /90   Pulse 89   Temp 98.5  F (36.9  C) (Oral)   LMP 02/19/2019 (Approximate)   BMI= There is no height or weight on file to calculate BMI.    EXAM:  General:  Well-nourished, well-developed in no acute distress.  Appears to be stated age, interacts appropriately and expresses understanding of information given to  patient.    Current Outpatient Medications   Medication Sig Dispense Refill     amLODIPine (NORVASC) 10 MG tablet Take 1 tablet (10 mg) by mouth daily 90 tablet 0     aspirin EC 81 MG EC tablet Take 1 tablet (81 mg) by mouth daily       atorvastatin (LIPITOR) 40 MG tablet Take 1 tablet (40 mg) by mouth daily 30 tablet 12     atovaquone-proguanil (MALARONE) 250-100 MG tablet Take 1 tablet by mouth daily Start 2 days before exposure to Malaria and continue daily till  7 days after exposure. 30 tablet 0     losartan (COZAAR) 50 MG tablet Take 1 tablet (50 mg) by mouth daily 90 tablet 0     metoprolol (LOPRESSOR) 100 MG tablet Take 1 tablet (100 mg) by mouth 2 times daily 60 tablet 12     Potassium Chloride ER 20 MEQ TBCR Take 1 tablet (20 mEq) by mouth daily 90 tablet 4     cetirizine HCl 10 MG CAPS Take 10 mg by mouth daily as needed (Patient not taking: Reported on 3/14/2019) 30 capsule 3     nitroGLYcerin (NITROSTAT) 0.4 MG sublingual tablet Place 1 tablet (0.4 mg) under the tongue every 5 minutes as needed for chest pain (Maximum 3 doses in 15 minutes, if needed.) (Patient not taking: Reported on 3/14/2019) 25 tablet 1     Patient Active Problem List   Diagnosis     ACS (acute coronary syndrome) (H)     Dissection of coronary artery     NSTEMI (non-ST elevated myocardial infarction) (H)     Benign essential hypertension     Chest pain     Mixed hyperlipidemia     Allergies   Allergen Reactions     Imdur [Isosorbide]      Headaches         Immunizations discussed include:   Hepatitis A:  Immune  Hepatitis B: Up to date  Influenza: Up to date  Typhoid: Ordered/given today, risks, benefits and side effects reviewed  Rabies: Declined  reviewed managment of a animal bite or scratch (washing wound, seek medical care within 24 hours for post exposure prophylaxis )  Yellow Fever: Stamaril Ordered/given today - consent completed, side effects, precautions, allergies, risks discussed. Patient expressed  understanding.  Amharic Encephalitis: Not indicated  Meningococcus: Ordered/given today, risks, benefits and side effects reviewed  Tetanus/Diphtheria: Ordered/given today, risks, benefits and side effects reviewed  Measles/Mumps/Rubella: Ordered/given today  Cholera: Not needed  Polio: Ordered/given today, risks, benefits and side effects reviewed  Pneumococcal: Up to date  Varicella: Immune by disease history per patient report  Zostavax:  Not indicated  Shingrix: Not indicated  HPV:  Not indicated  TB:  Low risk       Stamaril Informed Consent    The patient was provided with a copy of the IRB-approved consent form and all questions were answered before the patient agreed to participate by signing the informed consent document.   A copy of the form was provided to the patient.    Date: 3/14/2019  Consent Version Date: 5/10/2017   Consent Obtained by:  Laura Benz CNP (Lori)     HIPAA:  Yes  HIPAA Authorization Signed Date: March 18, 2019       Inclusion/Exclusion Criteria:    (Similar to Yellow Fever-VAX)      The patient met all of the following inclusion criteria in order to be eligible for the Stamaril vaccination under this EAP (Expanded Access Investigational New Drug Program)           At increased risk for YF, including researchers, laboratory workers, vaccine production staff, and those who are traveling within 30 days to a YF-endemic region or to a country requiring proof of YF vaccination under IHRs (International Health Regulations)?       Yes     Patient is greater than or equal to 9 months of age on the day of vaccination?     Yes     Patient is greater than or equal to 18 years of age and signed and dated the Consent Forms?     Yes     Patient is < 18 years of age and parent(s)/guardian(s) signed and dated the Consent Forms?      Patient is 7 years to < 18 years of age and signed and dated the Assent form?        No Assent is required.  Patient is <7 years of age.     No      No      N/A     The  patient did not meet any of the following criteria that would have excluded the patient from receiving the Stamaril vaccination under this EAP              Patient is less than 9 months of age.       No     The patient is breast-feeding and cannot stop nursing for at least 14 days after vaccination.    Note: Yellow Fever vaccine virus may be transmissible via breast milk by nursing mothers who are vaccinated during the final 2 weeks of pregnancy or post-partum.   Following transmission, infants may develop encephalitis.  The minimum time of discontinuation of breastfeeding for 14 days after vaccination is based on the expected clearance of live-attenuated vaccine virus.       No     The patient is immunosuppressed, whether congenital or idiopathic, including for example, leukemia, lymphoma, other malignancies, and patients who are receiving immunosuppressant medications (e.g. Systemic corticosteroids [greater than the standard dose of topical or inhaled steroids], alkylating drugs, antimetabolites, of other cytotoxic or immunomodulatory drugs) or radiation therapy or organ transplantation.       No     The patient has known hypersensitivity to the active substance or to any of the excipients of Stamaril vaccine or to eggs or chicken proteins.     No     The patient is symptomatic for human immunodeficiency virus (HIV) infection     No     The patient is asymptomatic for HIV infection but accompanied by evidence of severe immune suppression    Note:  Evidence of severe immune suppression includes CD4+ T-cell counts < 200 cubic millimeters (or < 15% total lymphocytes in children aged < 6 years), or as determined by the health care provider.       No     The patient has a history of thymus dysfunction (including myasthenia gravis, thymoma, thymectomy)     No     Moderate or severe febrile illness or acute illness    Note: Participation in the EAP can be reassessed when moderate or severe febrile illness or acute  illness has resolved.       No       Altitude Exposure on this trip: no  Past tolerance to Altitude: na    ASSESSMENT/PLAN:    ICD-10-CM    1. Travel advice encounter Z71.89 atovaquone-proguanil (MALARONE) 250-100 MG tablet     azithromycin (ZITHROMAX) 500 MG tablet     DISCONTINUED: typhoid (VIVOTIF) CR capsule     I have reviewed general recommendations for safe travel   including: food/water precautions, insect precautions, safer sex   practices given high prevalence of Zika, HIV and other STDs,   roadway safety. Educational materials and [employer] report provided.    Malaraia prophylaxis recommended: Malarone  Symptomatic treatment for traveler's diarrhea: azithromycin  Altitude illness prevention and treatment: none      Evacuation insurance advised and resources were provided to patient.    Total visit time 30 minutes  with over 50% of time spent counseling patient as detailed above.    Laura Benz CNP

## 2019-05-09 ENCOUNTER — RECORDS - HEALTHEAST (OUTPATIENT)
Dept: ADMINISTRATIVE | Facility: OTHER | Age: 54
End: 2019-05-09

## 2019-05-13 ENCOUNTER — COMMUNICATION - HEALTHEAST (OUTPATIENT)
Dept: FAMILY MEDICINE | Facility: CLINIC | Age: 54
End: 2019-05-13

## 2019-05-13 DIAGNOSIS — E87.6 HYPOKALEMIA: ICD-10-CM

## 2019-05-13 DIAGNOSIS — I10 HYPERTENSION: ICD-10-CM

## 2019-05-17 ENCOUNTER — OFFICE VISIT - HEALTHEAST (OUTPATIENT)
Dept: FAMILY MEDICINE | Facility: CLINIC | Age: 54
End: 2019-05-17

## 2019-05-17 ENCOUNTER — COMMUNICATION - HEALTHEAST (OUTPATIENT)
Dept: TELEHEALTH | Facility: CLINIC | Age: 54
End: 2019-05-17

## 2019-05-17 DIAGNOSIS — E78.00 PURE HYPERCHOLESTEROLEMIA: ICD-10-CM

## 2019-05-17 DIAGNOSIS — E66.01 MORBID OBESITY (H): ICD-10-CM

## 2019-05-17 DIAGNOSIS — K05.10 GINGIVITIS: ICD-10-CM

## 2019-05-17 DIAGNOSIS — R10.32 ABDOMINAL PAIN, LEFT LOWER QUADRANT: ICD-10-CM

## 2019-05-17 DIAGNOSIS — I10 HYPERTENSION: ICD-10-CM

## 2019-05-17 DIAGNOSIS — I10 ESSENTIAL HYPERTENSION: ICD-10-CM

## 2019-05-17 DIAGNOSIS — I25.10 CORONARY ARTERY DISEASE INVOLVING NATIVE CORONARY ARTERY OF NATIVE HEART WITHOUT ANGINA PECTORIS: ICD-10-CM

## 2019-05-17 DIAGNOSIS — I10 ESSENTIAL HYPERTENSION WITH GOAL BLOOD PRESSURE LESS THAN 140/90: ICD-10-CM

## 2019-05-17 DIAGNOSIS — E87.6 HYPOKALEMIA: ICD-10-CM

## 2019-05-17 LAB
ALBUMIN SERPL-MCNC: 3.6 G/DL (ref 3.5–5)
ALP SERPL-CCNC: 55 U/L (ref 45–120)
ALT SERPL W P-5'-P-CCNC: 16 U/L (ref 0–45)
ANION GAP SERPL CALCULATED.3IONS-SCNC: 9 MMOL/L (ref 5–18)
AST SERPL W P-5'-P-CCNC: 18 U/L (ref 0–40)
BILIRUB SERPL-MCNC: 0.3 MG/DL (ref 0–1)
BUN SERPL-MCNC: 13 MG/DL (ref 8–22)
CALCIUM SERPL-MCNC: 9.3 MG/DL (ref 8.5–10.5)
CHLORIDE BLD-SCNC: 105 MMOL/L (ref 98–107)
CO2 SERPL-SCNC: 26 MMOL/L (ref 22–31)
CREAT SERPL-MCNC: 0.84 MG/DL (ref 0.6–1.1)
ERYTHROCYTE [DISTWIDTH] IN BLOOD BY AUTOMATED COUNT: 12.3 % (ref 11–14.5)
GFR SERPL CREATININE-BSD FRML MDRD: >60 ML/MIN/1.73M2
GLUCOSE BLD-MCNC: 78 MG/DL (ref 70–125)
HBA1C MFR BLD: 5.7 % (ref 3.5–6)
HCT VFR BLD AUTO: 39.6 % (ref 35–47)
HGB BLD-MCNC: 13.2 G/DL (ref 12–16)
LDLC SERPL CALC-MCNC: 26 MG/DL
MCH RBC QN AUTO: 29.9 PG (ref 27–34)
MCHC RBC AUTO-ENTMCNC: 33.4 G/DL (ref 32–36)
MCV RBC AUTO: 89 FL (ref 80–100)
PLATELET # BLD AUTO: 258 THOU/UL (ref 140–440)
PMV BLD AUTO: 7.9 FL (ref 7–10)
POTASSIUM BLD-SCNC: 3.4 MMOL/L (ref 3.5–5)
PROT SERPL-MCNC: 6.9 G/DL (ref 6–8)
RBC # BLD AUTO: 4.43 MILL/UL (ref 3.8–5.4)
SODIUM SERPL-SCNC: 140 MMOL/L (ref 136–145)
WBC: 6.2 THOU/UL (ref 4–11)

## 2019-05-20 ENCOUNTER — COMMUNICATION - HEALTHEAST (OUTPATIENT)
Dept: FAMILY MEDICINE | Facility: CLINIC | Age: 54
End: 2019-05-20

## 2019-09-18 ENCOUNTER — RECORDS - HEALTHEAST (OUTPATIENT)
Dept: ADMINISTRATIVE | Facility: OTHER | Age: 54
End: 2019-09-18

## 2020-05-21 ENCOUNTER — RECORDS - HEALTHEAST (OUTPATIENT)
Dept: ADMINISTRATIVE | Facility: OTHER | Age: 55
End: 2020-05-21

## 2020-07-06 ENCOUNTER — COMMUNICATION - HEALTHEAST (OUTPATIENT)
Dept: FAMILY MEDICINE | Facility: CLINIC | Age: 55
End: 2020-07-06

## 2020-07-06 DIAGNOSIS — E78.00 PURE HYPERCHOLESTEROLEMIA: ICD-10-CM

## 2020-07-06 DIAGNOSIS — I10 ESSENTIAL HYPERTENSION WITH GOAL BLOOD PRESSURE LESS THAN 140/90: ICD-10-CM

## 2020-07-06 DIAGNOSIS — I10 HYPERTENSION: ICD-10-CM

## 2020-07-06 DIAGNOSIS — E87.6 HYPOKALEMIA: ICD-10-CM

## 2020-09-16 ENCOUNTER — COMMUNICATION - HEALTHEAST (OUTPATIENT)
Dept: FAMILY MEDICINE | Facility: CLINIC | Age: 55
End: 2020-09-16

## 2020-09-16 DIAGNOSIS — E87.6 HYPOKALEMIA: ICD-10-CM

## 2020-09-23 ENCOUNTER — OFFICE VISIT - HEALTHEAST (OUTPATIENT)
Dept: FAMILY MEDICINE | Facility: CLINIC | Age: 55
End: 2020-09-23

## 2020-09-23 ENCOUNTER — RECORDS - HEALTHEAST (OUTPATIENT)
Dept: MAMMOGRAPHY | Facility: CLINIC | Age: 55
End: 2020-09-23

## 2020-09-23 DIAGNOSIS — Z11.4 SCREENING FOR HUMAN IMMUNODEFICIENCY VIRUS WITHOUT PRESENCE OF RISK FACTORS: ICD-10-CM

## 2020-09-23 DIAGNOSIS — I10 ESSENTIAL HYPERTENSION: ICD-10-CM

## 2020-09-23 DIAGNOSIS — Z11.59 ENCOUNTER FOR HCV SCREENING TEST FOR LOW RISK PATIENT: ICD-10-CM

## 2020-09-23 DIAGNOSIS — E78.00 PURE HYPERCHOLESTEROLEMIA: ICD-10-CM

## 2020-09-23 DIAGNOSIS — I25.10 CORONARY ARTERY DISEASE INVOLVING NATIVE CORONARY ARTERY OF NATIVE HEART WITHOUT ANGINA PECTORIS: ICD-10-CM

## 2020-09-23 DIAGNOSIS — N95.0 POSTMENOPAUSAL BLEEDING: ICD-10-CM

## 2020-09-23 DIAGNOSIS — Z12.31 ENCOUNTER FOR SCREENING MAMMOGRAM FOR MALIGNANT NEOPLASM OF BREAST: ICD-10-CM

## 2020-09-23 LAB
ALBUMIN SERPL-MCNC: 3.9 G/DL (ref 3.5–5)
ALP SERPL-CCNC: 76 U/L (ref 45–120)
ALT SERPL W P-5'-P-CCNC: 21 U/L (ref 0–45)
ANION GAP SERPL CALCULATED.3IONS-SCNC: 10 MMOL/L (ref 5–18)
AST SERPL W P-5'-P-CCNC: 18 U/L (ref 0–40)
BASOPHILS # BLD AUTO: 0 THOU/UL (ref 0–0.2)
BASOPHILS NFR BLD AUTO: 1 % (ref 0–2)
BILIRUB SERPL-MCNC: 0.3 MG/DL (ref 0–1)
BUN SERPL-MCNC: 11 MG/DL (ref 8–22)
CALCIUM SERPL-MCNC: 9 MG/DL (ref 8.5–10.5)
CHLORIDE BLD-SCNC: 105 MMOL/L (ref 98–107)
CHOLEST SERPL-MCNC: 91 MG/DL
CO2 SERPL-SCNC: 26 MMOL/L (ref 22–31)
CREAT SERPL-MCNC: 0.8 MG/DL (ref 0.6–1.1)
EOSINOPHIL # BLD AUTO: 0.2 THOU/UL (ref 0–0.4)
EOSINOPHIL NFR BLD AUTO: 3 % (ref 0–6)
ERYTHROCYTE [DISTWIDTH] IN BLOOD BY AUTOMATED COUNT: 12.1 % (ref 11–14.5)
FASTING STATUS PATIENT QL REPORTED: NO
GFR SERPL CREATININE-BSD FRML MDRD: >60 ML/MIN/1.73M2
GLUCOSE BLD-MCNC: 89 MG/DL (ref 70–125)
HCT VFR BLD AUTO: 40.7 % (ref 35–47)
HDLC SERPL-MCNC: 54 MG/DL
HGB BLD-MCNC: 13.3 G/DL (ref 12–16)
HIV 1+2 AB+HIV1 P24 AG SERPL QL IA: NEGATIVE
LDLC SERPL CALC-MCNC: 24 MG/DL
LYMPHOCYTES # BLD AUTO: 2.2 THOU/UL (ref 0.8–4.4)
LYMPHOCYTES NFR BLD AUTO: 38 % (ref 20–40)
MCH RBC QN AUTO: 29.5 PG (ref 27–34)
MCHC RBC AUTO-ENTMCNC: 32.6 G/DL (ref 32–36)
MCV RBC AUTO: 90 FL (ref 80–100)
MONOCYTES # BLD AUTO: 0.5 THOU/UL (ref 0–0.9)
MONOCYTES NFR BLD AUTO: 10 % (ref 2–10)
NEUTROPHILS # BLD AUTO: 2.8 THOU/UL (ref 2–7.7)
NEUTROPHILS NFR BLD AUTO: 49 % (ref 50–70)
PLATELET # BLD AUTO: 279 THOU/UL (ref 140–440)
PMV BLD AUTO: 7.6 FL (ref 7–10)
POTASSIUM BLD-SCNC: 3.5 MMOL/L (ref 3.5–5)
PROT SERPL-MCNC: 7.2 G/DL (ref 6–8)
RBC # BLD AUTO: 4.5 MILL/UL (ref 3.8–5.4)
SODIUM SERPL-SCNC: 141 MMOL/L (ref 136–145)
TRIGL SERPL-MCNC: 66 MG/DL
WBC: 5.7 THOU/UL (ref 4–11)

## 2020-09-23 ASSESSMENT — MIFFLIN-ST. JEOR: SCORE: 1890.53

## 2020-09-24 LAB — HCV AB SERPL QL IA: NEGATIVE

## 2020-09-25 ENCOUNTER — COMMUNICATION - HEALTHEAST (OUTPATIENT)
Dept: FAMILY MEDICINE | Facility: CLINIC | Age: 55
End: 2020-09-25

## 2020-09-30 ENCOUNTER — HOSPITAL ENCOUNTER (OUTPATIENT)
Dept: ULTRASOUND IMAGING | Facility: CLINIC | Age: 55
Discharge: HOME | End: 2020-09-30
Attending: FAMILY MEDICINE

## 2020-09-30 DIAGNOSIS — N95.0 POSTMENOPAUSAL BLEEDING: ICD-10-CM

## 2020-10-09 ENCOUNTER — HOSPITAL ENCOUNTER (OUTPATIENT)
Dept: MAMMOGRAPHY | Facility: CLINIC | Age: 55
Discharge: HOME | End: 2020-10-09
Attending: FAMILY MEDICINE

## 2020-10-09 DIAGNOSIS — N64.89 BREAST ASYMMETRY: ICD-10-CM

## 2020-10-14 ENCOUNTER — COMMUNICATION - HEALTHEAST (OUTPATIENT)
Dept: FAMILY MEDICINE | Facility: CLINIC | Age: 55
End: 2020-10-14

## 2020-10-21 ENCOUNTER — COMMUNICATION - HEALTHEAST (OUTPATIENT)
Dept: FAMILY MEDICINE | Facility: CLINIC | Age: 55
End: 2020-10-21

## 2020-10-21 DIAGNOSIS — E78.00 PURE HYPERCHOLESTEROLEMIA: ICD-10-CM

## 2020-10-21 DIAGNOSIS — E87.6 HYPOKALEMIA: ICD-10-CM

## 2020-10-21 DIAGNOSIS — I10 HYPERTENSION: ICD-10-CM

## 2020-10-21 DIAGNOSIS — I10 ESSENTIAL HYPERTENSION WITH GOAL BLOOD PRESSURE LESS THAN 140/90: ICD-10-CM

## 2021-05-03 ENCOUNTER — COMMUNICATION - HEALTHEAST (OUTPATIENT)
Dept: FAMILY MEDICINE | Facility: CLINIC | Age: 56
End: 2021-05-03

## 2021-05-03 DIAGNOSIS — E78.00 PURE HYPERCHOLESTEROLEMIA: ICD-10-CM

## 2021-05-03 DIAGNOSIS — E87.6 HYPOKALEMIA: ICD-10-CM

## 2021-05-03 DIAGNOSIS — I10 HYPERTENSION: ICD-10-CM

## 2021-05-03 DIAGNOSIS — I10 ESSENTIAL HYPERTENSION WITH GOAL BLOOD PRESSURE LESS THAN 140/90: ICD-10-CM

## 2021-05-13 ENCOUNTER — HOSPITAL ENCOUNTER (EMERGENCY)
Dept: EMERGENCY MEDICINE | Facility: CLINIC | Age: 56
Discharge: HOME | End: 2021-05-13
Attending: EMERGENCY MEDICINE
Payer: COMMERCIAL

## 2021-05-13 DIAGNOSIS — M54.6 ACUTE RIGHT-SIDED THORACIC BACK PAIN: ICD-10-CM

## 2021-05-13 LAB
ALBUMIN SERPL-MCNC: 3.8 G/DL (ref 3.5–5)
ALBUMIN UR-MCNC: NEGATIVE G/DL
ALP SERPL-CCNC: 72 U/L (ref 45–120)
ALT SERPL W P-5'-P-CCNC: 23 U/L (ref 0–45)
ANION GAP SERPL CALCULATED.3IONS-SCNC: 7 MMOL/L (ref 5–18)
APPEARANCE UR: CLEAR
AST SERPL W P-5'-P-CCNC: 17 U/L (ref 0–40)
BILIRUB DIRECT SERPL-MCNC: 0.2 MG/DL
BILIRUB SERPL-MCNC: 0.6 MG/DL (ref 0–1)
BILIRUB UR QL STRIP: NEGATIVE
BUN SERPL-MCNC: 13 MG/DL (ref 8–22)
C REACTIVE PROTEIN LHE: 0.2 MG/DL (ref 0–0.8)
CALCIUM SERPL-MCNC: 8.7 MG/DL (ref 8.5–10.5)
CHLORIDE BLD-SCNC: 105 MMOL/L (ref 98–107)
CO2 SERPL-SCNC: 29 MMOL/L (ref 22–31)
COLOR UR AUTO: COLORLESS
CREAT SERPL-MCNC: 0.83 MG/DL (ref 0.6–1.1)
ERYTHROCYTE [DISTWIDTH] IN BLOOD BY AUTOMATED COUNT: 13.7 % (ref 11–14.5)
GFR SERPL CREATININE-BSD FRML MDRD: >60 ML/MIN/1.73M2
GLUCOSE BLD-MCNC: 120 MG/DL (ref 70–125)
GLUCOSE UR STRIP-MCNC: NEGATIVE MG/DL
HCT VFR BLD AUTO: 36.7 % (ref 35–47)
HGB BLD-MCNC: 12.6 G/DL (ref 12–16)
HGB UR QL STRIP: NEGATIVE
KETONES UR STRIP-MCNC: NEGATIVE MG/DL
LEUKOCYTE ESTERASE UR QL STRIP: NEGATIVE
MCH RBC QN AUTO: 29 PG (ref 27–34)
MCHC RBC AUTO-ENTMCNC: 34.3 G/DL (ref 32–36)
MCV RBC AUTO: 85 FL (ref 80–100)
NITRATE UR QL: NEGATIVE
PH UR STRIP: 8 [PH] (ref 5–8)
PLATELET # BLD AUTO: 265 THOU/UL (ref 140–440)
PMV BLD AUTO: 9.4 FL (ref 8.5–12.5)
POTASSIUM BLD-SCNC: 3.4 MMOL/L (ref 3.5–5)
PROT SERPL-MCNC: 7.4 G/DL (ref 6–8)
RBC # BLD AUTO: 4.34 MILL/UL (ref 3.8–5.4)
SODIUM SERPL-SCNC: 141 MMOL/L (ref 136–145)
SP GR UR STRIP: 1.01 (ref 1–1.03)
UROBILINOGEN UR STRIP-ACNC: NORMAL
WBC: 6.9 THOU/UL (ref 4–11)

## 2021-05-13 ASSESSMENT — MIFFLIN-ST. JEOR: SCORE: 1894.5

## 2021-05-27 VITALS — WEIGHT: 278 LBS | BODY MASS INDEX: 42.13 KG/M2 | HEIGHT: 68 IN

## 2021-05-28 NOTE — TELEPHONE ENCOUNTER
RN cannot approve Refill Request    RN can NOT refill this medication PCP messaged that patient is overdue for Labs.       Nisa Field, [facility] Triage/Med Refill 5/14/2019    Requested Prescriptions   Pending Prescriptions Disp Refills     potassium chloride (K-DUR,KLOR-CON) 20 MEQ tablet [Pharmacy Med Name: POTA CHLORIDE 20MEQ ER TAB] 29 tablet 0     Sig: TAKE 1 TABLET BY MOUTH ONCE DAILY ,  PATIENT  NEEDS  APPOINTMENT  FOR  FURTHER  REFILLS.       Potassium Supplements Refill Protocol Failed - 5/13/2019 11:21 AM        Failed - Potassium level in last 12 months     No results found for: LN-POTASSIUM          Passed - PCP or prescribing provider visit in past 12 months       Last office visit with prescriber/PCP: 12/3/2018 Joseph Chaudhari MD OR same dept: 12/3/2018 Joseph Chaudhari MD OR same specialty: 12/3/2018 Joseph Chaudhari MD  Last physical: Visit date not found Last MTM visit: Visit date not found   Next visit within 3 mo: Visit date not found  Next physical within 3 mo: Visit date not found  Prescriber OR PCP: Joseph Chaudhari MD  Last diagnosis associated with med order: 1. Hypokalemia  - potassium chloride (K-DUR,KLOR-CON) 20 MEQ tablet [Pharmacy Med Name: POTA CHLORIDE 20MEQ ER TAB]; TAKE 1 TABLET BY MOUTH ONCE DAILY ,  PATIENT  NEEDS  APPOINTMENT  FOR  FURTHER  REFILLS.  Dispense: 29 tablet; Refill: 0  - losartan (COZAAR) 50 MG tablet [Pharmacy Med Name: LOSARTAN 50MG TAB]; TAKE 1 TABLET BY MOUTH ONCE DAILY ,  PATIENT  NEEDS  APPOINTMENT  FOR  FURTHER  REFILLS.  Dispense: 29 tablet; Refill: 0    2. Hypertension  - losartan (COZAAR) 50 MG tablet [Pharmacy Med Name: LOSARTAN 50MG TAB]; TAKE 1 TABLET BY MOUTH ONCE DAILY ,  PATIENT  NEEDS  APPOINTMENT  FOR  FURTHER  REFILLS.  Dispense: 29 tablet; Refill: 0    If protocol passes may refill for 12 months if within 3 months of last provider visit (or a total of 15 months).             losartan (COZAAR) 50 MG tablet [Pharmacy Med Name: LOSARTAN 50MG TAB] 29 tablet 0      Sig: TAKE 1 TABLET BY MOUTH ONCE DAILY ,  PATIENT  NEEDS  APPOINTMENT  FOR  FURTHER  REFILLS.       Angiotensin Receptor Blocker Protocol Failed - 5/13/2019 11:21 AM        Failed - Serum potassium within the past 12 months     No results found for: LN-POTASSIUM          Failed - Serum creatinine within the past 12 months     Creatinine   Date Value Ref Range Status   12/18/2017 0.82 0.60 - 1.10 mg/dL Final             Passed - PCP or prescribing provider visit in past 12 months       Last office visit with prescriber/PCP: 12/3/2018 Joseph Chaudhari MD OR same dept: 12/3/2018 Joseph Chaudhari MD OR same specialty: 12/3/2018 Joseph Chaudhari MD  Last physical: Visit date not found Last MTM visit: Visit date not found   Next visit within 3 mo: Visit date not found  Next physical within 3 mo: Visit date not found  Prescriber OR PCP: Joseph Chaudhari MD  Last diagnosis associated with med order: 1. Hypokalemia  - potassium chloride (K-DUR,KLOR-CON) 20 MEQ tablet [Pharmacy Med Name: POTA CHLORIDE 20MEQ ER TAB]; TAKE 1 TABLET BY MOUTH ONCE DAILY ,  PATIENT  NEEDS  APPOINTMENT  FOR  FURTHER  REFILLS.  Dispense: 29 tablet; Refill: 0  - losartan (COZAAR) 50 MG tablet [Pharmacy Med Name: LOSARTAN 50MG TAB]; TAKE 1 TABLET BY MOUTH ONCE DAILY ,  PATIENT  NEEDS  APPOINTMENT  FOR  FURTHER  REFILLS.  Dispense: 29 tablet; Refill: 0    2. Hypertension  - losartan (COZAAR) 50 MG tablet [Pharmacy Med Name: LOSARTAN 50MG TAB]; TAKE 1 TABLET BY MOUTH ONCE DAILY ,  PATIENT  NEEDS  APPOINTMENT  FOR  FURTHER  REFILLS.  Dispense: 29 tablet; Refill: 0    If protocol passes may refill for 12 months if within 3 months of last provider visit (or a total of 15 months).             Passed - Blood pressure filed in past 12 months     BP Readings from Last 1 Encounters:   12/03/18 (!) 134/94

## 2021-05-28 NOTE — PROGRESS NOTES
Dental pain   Referred for deep clean     Pending visit.  Right lower    otherwise fine  Working  No chest pain  No resp issues      Was in [place]   Swelled feet and ankles   Better when back two wks ago.   Was there 3 weeks    Hyperlipidemia on statin denies muscle pain  Coronary disease denies chest pain  Hypertension denies chest pain or headache.   Obesity denies polyuria    Was due to follow up card but $$ issues and insurance a barrier to suggested follow up   OBJECTIVE:   Vitals:    05/17/19 1437   BP: 132/88   Pulse: 68      Wt is noted.  No diaphoresis  Eyes: nl eom, anicteric     Mild lower right gingival erythema and edema   No tooth tenderness    External ears, nose: nl    Neck: nl nodes, supple, thyroid normal   Lungs: clear to ausc   Heart: regular rhythm  Abd: soft nontender     No cva (renal) tenderness  Neuro: no weakness  Skin no rash  Joints: uninflamed   No ketotic breath odor noted  Mental: euthymic  Ext: nontender calves   Gait: normal    Body mass index is 40.61 kg/m .    ASSESSMENT/PLAN:    1. Essential hypertension  Comprehensive Metabolic Panel   2. Morbid Obesity  Comprehensive Metabolic Panel    Glycosylated Hemoglobin A1c   3. Coronary artery disease involving native coronary artery of native heart without angina pectoris  nitroglycerin (NITROSTAT) 0.4 MG SL tablet    HM2(CBC w/o Differential)    aspirin (ASPIR-LOW) 81 MG EC tablet   4. Pure hypercholesterolemia  atorvastatin (LIPITOR) 40 MG tablet    LDL Cholesterol, Direct   5. Hypokalemia  potassium chloride (K-DUR,KLOR-CON) 20 MEQ tablet    losartan (COZAAR) 50 MG tablet    Comprehensive Metabolic Panel   6. Essential hypertension with goal blood pressure less than 140/90  metoprolol tartrate (LOPRESSOR) 100 MG tablet   7. Hypertension  losartan (COZAAR) 50 MG tablet    Comprehensive Metabolic Panel    amLODIPine (NORVASC) 10 MG tablet   8. Abdominal pain, left lower quadrant  acetaminophen-codeine (TYLENOL #3) 300-30 mg per tablet    9. Gingivitis       Chronic issues stable/ same treatment.  Life style modification   Will follow up card

## 2021-05-31 VITALS — WEIGHT: 263 LBS | BODY MASS INDEX: 39.86 KG/M2 | HEIGHT: 68 IN

## 2021-06-01 VITALS — WEIGHT: 269 LBS | BODY MASS INDEX: 40.9 KG/M2

## 2021-06-01 VITALS — BODY MASS INDEX: 41.06 KG/M2 | WEIGHT: 270.04 LBS

## 2021-06-02 VITALS — HEIGHT: 69 IN | WEIGHT: 265 LBS | BODY MASS INDEX: 39.25 KG/M2

## 2021-06-03 VITALS — BODY MASS INDEX: 40.61 KG/M2 | WEIGHT: 271 LBS

## 2021-06-05 VITALS
SYSTOLIC BLOOD PRESSURE: 126 MMHG | BODY MASS INDEX: 42.13 KG/M2 | HEART RATE: 76 BPM | DIASTOLIC BLOOD PRESSURE: 79 MMHG | TEMPERATURE: 98.6 F | RESPIRATION RATE: 20 BRPM | WEIGHT: 278 LBS | HEIGHT: 68 IN

## 2021-06-11 NOTE — TELEPHONE ENCOUNTER
Pt called asking for a refill on   potassium chloride (K-DUR,KLOR-CON) 20 MEQ tablet  30 tablet   Pt understood that pt has not been seen since May of 2019. Pt does have a future appointment on 9/23/20 @ 11 am Establish care with Dr. Tucker.     Please advise KEITH Faustin/ Garrett.

## 2021-06-11 NOTE — PROGRESS NOTES
"ASSESSMENT/PLAN:  1. Essential hypertension  Comprehensive Metabolic Panel   2. Coronary artery disease involving native coronary artery of native heart without angina pectoris  Lipid [place] FASTING    Comprehensive Metabolic Panel   3. Pure hypercholesterolemia  Lipid [place] FASTING    Comprehensive Metabolic Panel   4. Screening for human immunodeficiency virus without presence of risk factors  HIV Antigen/Antibody Screening [place]   5. Encounter for HCV screening test for low risk patient  Hepatitis C Antibody (Anti-HCV)   6. Postmenopausal bleeding  US Pelvis With Transvaginal Non OB    HM1(CBC and Differential)    HM1 (CBC with Diff)       This is a 56 yo female with:  1.  Hypertension - blood pressure is well controlled - continue Amlodipine, Losartan, Metoprolol - check CMP  2.  CAD - no new symptoms - continue current medications  3.  Hyperlipidemia -on Atorvastatin - stable - tolerating well   4.  Postmenopausal bleeding - patient needs evaluation - will check ultrasound of pelvis - if increased endometrial stripe, would move to Gyn referral - check hemogram as well to evaluate bleeding  5.  Health Maintenance  - discussed recommendation for HIV and hepatitis C screening - ordered  Return in about 4 weeks (around 10/21/2020) for Recheck.      Medications Discontinued During This Encounter   Medication Reason     BRILINTA 90 mg Tab      acetaminophen-codeine (TYLENOL #3) 300-30 mg per tablet Therapy completed     There are no Patient Instructions on file for this visit.    Chief Complaint:  Chief Complaint   Patient presents with     hospitals Care     Medication Refill       HPI:   Chula Palacios is a 55 y.o. female c/o  1.  A few years ago - had coronary artery disease - 5 years ago - 2015  Managed with medicine  Off Brilinta x at least a year  No current symptoms  2.  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  Periods - 2017 - every month or every other month  More recently -   2019 - then bled again in 2020  Started spotting again in 2020        PMH:   Patient Active Problem List    Diagnosis Date Noted     Vision abnormalities 2018     Irregular menses 2017     Coronary artery disease involving native coronary artery 2016     Hypokalemia 2016     Hyperlipidemia 2016     Stented coronary artery 2015     Lipoma Of The Adipose Tissue      Morbid Obesity      Essential Hypertension      Limb Pain      Serum Enzyme Levels - LDH Elevated      History reviewed. No pertinent past medical history.  Past Surgical History:   Procedure Laterality Date     [place]  DELIVERY ONLY      Description:  Section;  Recorded: 2014;     Social History     Socioeconomic History     Marital status:      Spouse name: Not on file     Number of children: Not on file     Years of education: Not on file     Highest education level: Not on file   Occupational History     Not on file   Social Needs     Financial resource strain: Not on file     Food insecurity     Worry: Not on file     Inability: Not on file     Transportation needs     Medical: Not on file     Non-medical: Not on file   Tobacco Use     Smoking status: Never Smoker     Smokeless tobacco: Never Used   Substance and Sexual Activity     Alcohol use: Not on file     Drug use: Not on file     Sexual activity: Not on file   Lifestyle     Physical activity     Days per week: Not on file     Minutes per session: Not on file     Stress: Not on file   Relationships     Social connections     Talks on phone: Not on file     Gets  "together: Not on file     Attends Holiness service: Not on file     Active member of club or organization: Not on file     Attends meetings of clubs or organizations: Not on file     Relationship status: Not on file     Intimate partner violence     Fear of current or ex partner: Not on file     Emotionally abused: Not on file     Physically abused: Not on file     Forced sexual activity: Not on file   Other Topics Concern     Not on file   Social History Narrative     Not on file     History reviewed. No pertinent family history.    Meds:    Current Outpatient Medications:      amLODIPine (NORVASC) 10 MG tablet, Take 1 tablet by mouth once daily, Disp: 90 tablet, Rfl: 0     aspirin (ASPIR-LOW) 81 MG EC tablet, TAKE ONE TABLET BY MOUTH ONCE DAILY, Disp: 100 tablet, Rfl: 2     atorvastatin (LIPITOR) 40 MG tablet, TAKE 1 TABLET BY MOUTH AT BEDTIME, Disp: 90 tablet, Rfl: 0     losartan (COZAAR) 50 MG tablet, Take 1 tablet by mouth once daily, Disp: 90 tablet, Rfl: 0     metoprolol tartrate (LOPRESSOR) 100 MG tablet, Take 1 tablet by mouth twice daily, Disp: 180 tablet, Rfl: 0     nitroglycerin (NITROSTAT) 0.4 MG SL tablet, Place 1 tablet (0.4 mg total) under the tongue every 5 (five) minutes as needed for chest pain., Disp: 90 tablet, Rfl: 3     potassium chloride (K-DUR,KLOR-CON) 20 MEQ tablet, Take 1 tablet (20 mEq total) by mouth daily. Need to be seen for future refills., Disp: 30 tablet, Rfl: 0    Allergies:  Allergies   Allergen Reactions     Bee Pollen      Other reaction(s): Sneezing     Isosorbide      Headaches       ROS:  Pertinent positives as noted in HPI; otherwise 12 point ROS negative.      Physical Exam:  EXAM:  /79 (Patient Site: Right Arm, Patient Position: Sitting, Cuff Size: Adult Regular)   Pulse 76   Temp 98.6  F (37  C) (Tympanic)   Resp 20   Ht 5' 7.75\" (1.721 m)   Wt (!) 278 lb (126.1 kg)   BMI 42.58 kg/m     Gen:  NAD, appears well, well-hydrated  HEENT:  TMs nl, oropharynx " benign, nasal mucosa nl, conjunctiva clear  Neck:  Supple, no adenopathy, no thyromegaly, no carotid bruits, no JVD  Lungs:  Clear to auscultation bilaterally  Cor:  RRR no murmur  Abd:  Soft, nontender, BS+, no masses, no guarding or rebound, no HSM  Extr:  Neg., no edema, pulses full to extremities  Neuro:  No asymmetry  Skin:  Warm/dry        Results:  Results for orders placed or performed in visit on 09/23/20   Lipid [place] FASTING   Result Value Ref Range    Cholesterol 91 <=199 mg/dL    Triglycerides 66 <=149 mg/dL    HDL Cholesterol 54 >=50 mg/dL    LDL Calculated 24 <=129 mg/dL    Patient Fasting > 8hrs? No    Comprehensive Metabolic Panel   Result Value Ref Range    Sodium 141 136 - 145 mmol/L    Potassium 3.5 3.5 - 5.0 mmol/L    Chloride 105 98 - 107 mmol/L    CO2 26 22 - 31 mmol/L    Anion Gap, Calculation 10 5 - 18 mmol/L    Glucose 89 70 - 125 mg/dL    BUN 11 8 - 22 mg/dL    Creatinine 0.80 0.60 - 1.10 mg/dL    GFR MDRD Af Amer >60 >60 mL/min/1.73m2    GFR MDRD Non Af Amer >60 >60 mL/min/1.73m2    Bilirubin, Total 0.3 0.0 - 1.0 mg/dL    Calcium 9.0 8.5 - 10.5 mg/dL    Protein, Total 7.2 6.0 - 8.0 g/dL    Albumin 3.9 3.5 - 5.0 g/dL    Alkaline Phosphatase 76 45 - 120 U/L    AST 18 0 - 40 U/L    ALT 21 0 - 45 U/L   Hepatitis C Antibody (Anti-HCV)   Result Value Ref Range    Hepatitis C Ab Negative Negative   HIV Antigen/Antibody Screening [place]   Result Value Ref Range    HIV Antigen / Antibody Negative Negative   HM1 (CBC with Diff)   Result Value Ref Range    WBC 5.7 4.0 - 11.0 thou/uL    RBC 4.50 3.80 - 5.40 mill/uL    Hemoglobin 13.3 12.0 - 16.0 g/dL    Hematocrit 40.7 35.0 - 47.0 %    MCV 90 80 - 100 fL    MCH 29.5 27.0 - 34.0 pg    MCHC 32.6 32.0 - 36.0 g/dL    RDW 12.1 11.0 - 14.5 %    Platelets 279 140 - 440 thou/uL    MPV 7.6 7.0 - 10.0 fL    Neutrophils % 49 (L) 50 - 70 %    Lymphocytes % 38 20 - 40 %    Monocytes % 10 2 - 10 %    Eosinophils % 3 0 - 6 %    Basophils % 1 0 - 2 %     Neutrophils Absolute 2.8 2.0 - 7.7 thou/uL    Lymphocytes Absolute 2.2 0.8 - 4.4 thou/uL    Monocytes Absolute 0.5 0.0 - 0.9 thou/uL    Eosinophils Absolute 0.2 0.0 - 0.4 thou/uL    Basophils Absolute 0.0 0.0 - 0.2 thou/uL

## 2021-06-12 NOTE — TELEPHONE ENCOUNTER
Patient Returning Call  Reason for call:  Patient called back.  Information relayed to patient:  n/a  Patient has additional questions:  Yes  If YES, what are your questions/concerns:  Calling back on the status of these medications.  Please address today.  Okay to leave a detailed message?: Yes

## 2021-06-14 NOTE — PROGRESS NOTES
Subjective:     Chula Palacios is a 52 y.o. female who presents for an annual exam.     Other concerns today:  1.  Irregular menses.  She notes that ever since this year started, her period often skipped some months.  Additionally, she has had heavier periods every month for the past 3 months.  She does not think the bleeding is too much, however it is a change from her previous periods.  No unusual vaginal discharge.    She also notes she has some blurry vision.  She has a glasses prescription to but does not wear it.  She goes to [facility].  She will contact them herself to schedule a follow-up appointment.  Estimates she exercises 2-4 times a week.  She notes that she normally takes her blood pressure medicines in the evening, as she works overnight shift.  She is feeling fine, no chest pain or other concerns.    Immunization History   Administered Date(s) Administered     Hep B, historic 2011, 2012, 2013     Hepatitis A: Immune 2014     Influenza, inj, historic,unspecified 2015     Influenza, seasonal,quad inj 36+ mos 2017     Tdap 2011     Gynecologic History  Patient's last menstrual period was 2017 (exact date).  Contraception: none  Last Pap: several yrs ago  Last mammogram: . Results were: normal    OB History    Para Term  AB Living   3 3 3      SAB TAB Ectopic Multiple Live Births             # Outcome Date GA Lbr Manolo/2nd Weight Sex Delivery Anes PTL Lv   3 Term            2 Term            1 Term                     Current Outpatient Prescriptions:      amLODIPine (NORVASC) 10 MG tablet, TAKE ONE TABLET BY MOUTH ONCE DAILY, Disp: 90 tablet, Rfl: 0     aspirin (ASPIR-LOW) 81 MG EC tablet, TAKE ONE TABLET BY MOUTH ONCE DAILY, Disp: 100 tablet, Rfl: 2     atorvastatin (LIPITOR) 40 MG tablet, Take 1 tablet (40 mg total) by mouth bedtime., Disp: 90 tablet, Rfl: 3     BRILINTA 90 mg Tab, TAKE ONE TABLET BY MOUTH TWICE DAILY, Disp:  60 tablet, Rfl: 6     losartan (COZAAR) 50 MG tablet, TAKE ONE TABLET BY MOUTH ONCE DAILY, Disp: 89 tablet, Rfl: 0     metoprolol tartrate (LOPRESSOR) 100 MG tablet, TAKE ONE TABLET BY MOUTH TWICE DAILY, Disp: 180 tablet, Rfl: 0     potassium chloride SA (K-DUR,KLOR-CON) 20 MEQ tablet, TAKE ONE TABLET BY MOUTH ONCE DAILY, Disp: 89 tablet, Rfl: 1     nitroglycerin (NITROSTAT) 0.4 MG SL tablet, Place 1 tablet (0.4 mg total) under the tongue every 5 (five) minutes as needed for chest pain., Disp: 90 tablet, Rfl: 3  No past medical history on file.  Past Surgical History:   Procedure Laterality Date     [place]  DELIVERY ONLY      Description:  Section;  Recorded: 2014;     Review of patient's allergies indicates no known allergies.  No family history on file.  Social History     Social History     Marital status:      Spouse name: N/A     Number of children: N/A     Years of education: N/A     Occupational History     Not on file.     Social History Main Topics     Smoking status: Never Smoker     Smokeless tobacco: Not on file     Alcohol use Not on file     Drug use: Not on file     Sexual activity: Not on file     Other Topics Concern     Not on file     Social History Narrative       Review of Systems  Complete Review of Systems is discussed with patient and is negative except as noted in HPI.    Objective:     Vitals:    17 0902   BP: (!) 144/98   Pulse: 76   Resp: 20   Temp: 98  F (36.7  C)     Body mass index is 39.99 kg/(m^2).    Physical Exam:  General: Patient is alert and Oriented x 3, in no apparent distress.  HEENT, Thyroid, Lymphatic, Cardiac, Pulmonary, GI, Musculoskeletal, and Neuro exams were completed today and grossly normal.  Breast Exam: No lumps, skin changes, lymphadenopathy, or nipple discharge noted bilaterally.  Genitourinary Exam: External genitalia is normal in appearance, vaginal walls are healthy and without lesions, no significant discharge noted in the  vaginal vault, cervix is well visualized and normal in appearance.  Pap was taken without difficulty.    Results for orders placed or performed in visit on 12/18/17   Basic Metabolic Panel   Result Value Ref Range    Sodium 143 136 - 145 mmol/L    Potassium 4.0 3.5 - 5.0 mmol/L    Chloride 107 98 - 107 mmol/L    CO2 24 22 - 31 mmol/L    Anion Gap, Calculation 12 5 - 18 mmol/L    Glucose 91 70 - 125 mg/dL    Calcium 9.0 8.5 - 10.5 mg/dL    BUN 10 8 - 22 mg/dL    Creatinine 0.82 0.60 - 1.10 mg/dL    GFR MDRD Af Amer >60 >60 mL/min/1.73m2    GFR MDRD Non Af Amer >60 >60 mL/min/1.73m2   HM2(CBC w/o Differential)   Result Value Ref Range    WBC 4.8 4.0 - 11.0 thou/uL    RBC 4.46 3.80 - 5.40 mill/uL    Hemoglobin 13.2 12.0 - 16.0 g/dL    Hematocrit 40.2 35.0 - 47.0 %    MCV 90 80 - 100 fL    MCH 29.5 27.0 - 34.0 pg    MCHC 32.7 32.0 - 36.0 g/dL    RDW 12.2 11.0 - 14.5 %    Platelets 271 140 - 440 thou/uL    MPV 7.9 7.0 - 10.0 fL   Lipid Cascade   Result Value Ref Range    Cholesterol 98 <=199 mg/dL    Triglycerides 38 <=149 mg/dL    HDL Cholesterol 54 >=50 mg/dL    LDL Calculated 36 <=129 mg/dL    Patient Fasting > 8hrs? Yes    Glycosylated Hemoglobin A1c   Result Value Ref Range    Hemoglobin A1c 5.5 3.5 - 6.0 %   Other labs pending.    Assessment and Plan:     1. Physical Exam.  Health Maintenance discussed with patient as appropriate for age and risk factors.  Pap smear was completed today.  STD screening was completed today.  Flu shot was given.  Mammogram was ordered.  She is up-to-date on her colonoscopy.  Screening labs pending.    2.  Change in menstrual cycles.  Likely due to perimenopause.  Continue to monitor for now.  Normal hemoglobin.  I offered patient a pelvic ultrasound and/or referral to OB/GYN, but she would like to monitor for the next few months.  Follow-up as needed.    3.  Obesity.  I strongly encouraged increased exercise and healthy eating.  She notes she cooks most of her food at home.  However,  she knows she is not exercising as much as she should.  Encouraged her to increase exercise.  She does have exercise equipment available in her apartment building.      4.  Essential hypertension, with history of coronary stent and CAD.  Patient is taking her medications as directed.  Blood pressure slightly high today because she has not yet taken her medicines.  She says she takes her medicines at night because she works night shift.    5.  Hypokalemia.  Patient is taking a potassium supplement.  Screening potassium was normal today.    The following high BMI interventions were performed this visit: encouragement to exercise and lifestyle education regarding diet    This dictation uses voice recognition software, which may contain typographical errors.

## 2021-06-15 NOTE — PROGRESS NOTES
I met with Chula Palacios at the request of Dr. Chaudhari to recheck her blood pressure.  Blood pressure medications on the MAR were reviewed with patient.    Patient has taken all medications as per usual regimen: Yes  Patient reports tolerating them without any issues or concerns: Yes    There were no vitals filed for this visit.    Blood pressure was taken, previous encounter was reviewed, recorded blood pressure below 140/90.  Patient was discharged and the note will be sent to the provider for final review.     VANCE Parker/CA  [facility]

## 2021-06-17 NOTE — ED TRIAGE NOTES
Patient is here with right flank pain that started yesterday. She then developed shortness of breath due to the pain. She denies hx of kidney stones. Denies any issues with her bowel or bladder.

## 2021-06-17 NOTE — ED PROVIDER NOTES
EMERGENCY DEPARTMENT ENCOUNTER      NAME: Chula Palacios  AGE: 55 y.o. female  YOB: 1965  MRN: [id number]  EVALUATION DATE & TIME: 2021  6:27 AM    PCP: Tessy Gerber MD    ED PROVIDER: Hai Alvarez M.D.      Chief Complaint   Patient presents with     Back Pain     Shortness of Breath         FINAL IMPRESSION:  Acute back pain  Hepatic steatosis    ED COURSE & MEDICAL DECISION MAKIN:42 AM I met with the patient to gather history and to perform my initial exam wearing goggles, facial shield, cap, gloves, and N95 mask. I discussed the plan for care while in the Emergency Department.    7:50 AM.  Laboratory evaluation unremarkable other than minimal noncontributory hypokalemia.  Preliminary review of CT imaging reveals unusual axis of the left kidney.  Possible hydroureter on the right.  Distal 5 mm calcification in the region of the right UPJ.  Awaiting definitive report.  Patient given intravenous morphine for additional discomfort.    8:24 AM.  CT imaging with hepatic steatosis.  Bladder wall thickening suggestive of cystitis.  However urine analysis without evidence of infection.  No findings to explain symptomatology.  Discomfort could relate to her steatosis but seems less likely.  Given increased pain with movement musculoskeletal etiology seems likely.  Will continue outpatient management with routine follow-up precautions.      Pertinent Labs & Imaging studies reviewed. (See chart for details)  55 y.o. female presents to the Emergency Department for evaluation of back pain.  Patient started with an achiness in the right flank area yesterday.  She reports its tolerable/mild when at rest.  With movement however it worsens significantly.  With deep respirations it worsens.  Patient took some Tylenol last night with some slight improvement.  This morning the pain seems to be worse.  She denies any nausea, vomiting or diarrhea.  No shortness of breath or recent travel to  suggest pulmonary embolism.  Denies prior similar episodes.  No overexertion's which may have caused injury.  On exam she is an obese female in mild distress.  Heart and lungs are unremarkable.  Abdomen is obese soft and nontender.  Patient with no midline back tenderness nor paraspinal tenderness.  Moderate percussive tenderness in the right flank suggesting renal colic.  Labs are sent for evaluation including urine analysis.  CT imaging will be obtained given the amount of discomfort.  Intravenous Toradol and Benadryl given for symptomatic relief.    At the conclusion of the encounter I discussed the results of all of the tests and the disposition. The questions were answered. The patient or family acknowledged understanding and was agreeable with the care plan.     Current Discharge Medication List      START taking these medications    Details   cyclobenzaprine (FLEXERIL) 10 MG tablet Take 1 tablet (10 mg total) by mouth 3 (three) times a day as needed for muscle spasms.  Qty: 20 tablet, Refills: 0    Associated Diagnoses: Acute right-sided thoracic back pain      ibuprofen (ADVIL,MOTRIN) 800 MG tablet Take 1 tablet (800 mg total) by mouth 3 (three) times a day.  Qty: 21 tablet, Refills: 0    Associated Diagnoses: Acute right-sided thoracic back pain           MEDICATIONS GIVEN IN THE EMERGENCY:  Medications   ketorolac injection 15 mg (TORADOL) (has no administration in time range)   diphenhydrAMINE injection 25 mg (BENADRYL) (has no administration in time range)       NEW PRESCRIPTIONS STARTED AT TODAY'S ER VISIT  Current Discharge Medication List      CONTINUE these medications which have NOT CHANGED    Details   amLODIPine (NORVASC) 10 MG tablet Take 1 tablet by mouth once daily  Qty: 90 tablet, Refills: 0    Associated Diagnoses: Hypertension      aspirin (ASPIR-LOW) 81 MG EC tablet TAKE ONE TABLET BY MOUTH ONCE DAILY  Qty: 100 tablet, Refills: 2    Associated Diagnoses: Coronary artery disease involving  native coronary artery of native heart without angina pectoris      atorvastatin (LIPITOR) 40 MG tablet TAKE 1 TABLET BY MOUTH ONCE DAILY IN THE EVENING  Qty: 90 tablet, Refills: 0    Associated Diagnoses: Pure hypercholesterolemia      losartan (COZAAR) 50 MG tablet Take 1 tablet by mouth once daily  Qty: 90 tablet, Refills: 0    Associated Diagnoses: Hypertension; Hypokalemia      metoprolol tartrate (LOPRESSOR) 100 MG tablet Take 1 tablet by mouth twice daily  Qty: 180 tablet, Refills: 0    Associated Diagnoses: Essential hypertension with goal blood pressure less than 140/90      nitroglycerin (NITROSTAT) 0.4 MG SL tablet Place 1 tablet (0.4 mg total) under the tongue every 5 (five) minutes as needed for chest pain.  Qty: 90 tablet, Refills: 3    Associated Diagnoses: Coronary artery disease involving native coronary artery of native heart without angina pectoris      potassium chloride (K-DUR,KLOR-CON) 20 MEQ tablet TAKE 1  BY MOUTH ONCE DAILY  Qty: 90 tablet, Refills: 0    Associated Diagnoses: Hypokalemia                =================================================================    HPI    Patient information was obtained from: the patient    Use of : N/A         Chula Palacios is a 55 y.o. female with a pertinent history of limb pain and morbid obesity who presents to this ED via personal car for evaluation of back pain.     Yesterday, the patient reports pain in her back that increased around 11PM (~8 hours ago). Her pain initially started as a soreness, and still feels sore while laying down, but becomes painful with any movement or deep breathing. She's never had this pain before and took tylenol without relief. Denies any back injury or trauma.     Denies any recent activity changes. Denies any nausea, vomiting, dysuria, frequency, constipation, diarrhea, cough, leg swelling, leg pain, or any other symptoms at this time. Denies any recent travel. Notes a medication allergy to a blood  "pressure med.     LMP: 6 months ago, patient says she thinks that her cycle \"is gone\".       REVIEW OF SYSTEMS   Review of Systems   Respiratory: Positive for shortness of breath (secondary). Negative for cough.    Gastrointestinal: Negative for constipation, diarrhea, nausea and vomiting.   Genitourinary: Negative for dysuria and frequency.   Musculoskeletal: Positive for back pain. Negative for joint swelling.        Negative for leg pain   All other systems reviewed and are negative.       PAST MEDICAL HISTORY:  No past medical history on file.    PAST SURGICAL HISTORY:  Past Surgical History:   Procedure Laterality Date     [place]  DELIVERY ONLY      Description:  Section;  Recorded: 2014;       CURRENT MEDICATIONS:    No current facility-administered medications on file prior to encounter.      Current Outpatient Medications on File Prior to Encounter   Medication Sig     amLODIPine (NORVASC) 10 MG tablet Take 1 tablet by mouth once daily     aspirin (ASPIR-LOW) 81 MG EC tablet TAKE ONE TABLET BY MOUTH ONCE DAILY     atorvastatin (LIPITOR) 40 MG tablet TAKE 1 TABLET BY MOUTH ONCE DAILY IN THE EVENING     losartan (COZAAR) 50 MG tablet Take 1 tablet by mouth once daily     metoprolol tartrate (LOPRESSOR) 100 MG tablet Take 1 tablet by mouth twice daily     nitroglycerin (NITROSTAT) 0.4 MG SL tablet Place 1 tablet (0.4 mg total) under the tongue every 5 (five) minutes as needed for chest pain.     potassium chloride (K-DUR,KLOR-CON) 20 MEQ tablet TAKE 1  BY MOUTH ONCE DAILY       ALLERGIES:  Allergies   Allergen Reactions     Bee Pollen      Other reaction(s): Sneezing     Isosorbide      Headaches       FAMILY HISTORY:  No family history on file.    SOCIAL HISTORY:   Social History     Socioeconomic History     Marital status:      Spouse name: Not on file     Number of children: Not on file     Years of education: Not on file     Highest education level: Not on file   Occupational " "History     Not on file   Social Needs     Financial resource strain: Not on file     Food insecurity     Worry: Not on file     Inability: Not on file     Transportation needs     Medical: Not on file     Non-medical: Not on file   Tobacco Use     Smoking status: Never Smoker     Smokeless tobacco: Never Used   Substance and Sexual Activity     Alcohol use: Not on file     Drug use: Not on file     Sexual activity: Not on file   Lifestyle     Physical activity     Days per week: Not on file     Minutes per session: Not on file     Stress: Not on file   Relationships     Social connections     Talks on phone: Not on file     Gets together: Not on file     Attends Hoahaoism service: Not on file     Active member of club or organization: Not on file     Attends meetings of clubs or organizations: Not on file     Relationship status: Not on file     Intimate partner violence     Fear of current or ex partner: Not on file     Emotionally abused: Not on file     Physically abused: Not on file     Forced sexual activity: Not on file   Other Topics Concern     Not on file   Social History Narrative     Not on file       VITALS:  Patient Vitals for the past 24 hrs:   BP Temp Temp src Pulse Resp SpO2 Height Weight   05/13/21 0631 (!) 172/94 98.8  F (37.1  C) Oral 75 18 99 % 5' 8\" (1.727 m) (!) 278 lb (126.1 kg)   05/13/21 0630 (!) 172/94 -- -- 83 -- 98 % -- --       PHYSICAL EXAM    GENERAL: Awake, alert.  In mild distress. Obese.  HEENT: Normocephalic, atraumatic.  Pupils equal, round and reactive.  Conjunctiva normal.  EOMI.  NECK: No stridor.  PULMONARY: Symmetrical breath sounds without distress.  Lungs clear to auscultation bilaterally without wheezes, rhonchi or rales.  CARDIO: Regular rate and rhythm.  No significant murmur, rub or gallop.  Radial pulses strong and symmetrical.  ABDOMINAL: Mild right CVA tenderness. Abdomen soft, non-distended and non-tender to palpation.    EXTREMITIES: No midline back tenderness or " perispinal tenderness. No lower extremity swelling or edema.    NEURO: Alert and oriented to person, place and time.  Cranial nerves grossly intact.  No focal motor deficit.  PSYCH: Normal mood and affect  SKIN: No rashes       LAB:  All pertinent labs reviewed and interpreted.  Results for orders placed or performed during the hospital encounter of 05/13/21   Urinalysis-UC if Indicated   Result Value Ref Range    Color, UA Colorless Light Yellow, Yellow    Clarity, UA Clear Clear    Glucose, UA Negative Negative    Protein, UA Negative Negative    Bilirubin, UA Negative Negative    Urobilinogen, UA <2.0 mg/dL <2.0 mg/dL    pH, UA 8.0 5.0 - 8.0    Blood, UA Negative Negative    Ketones, UA Negative Negative    Nitrite, UA Negative Negative    Leukocytes, UA Negative Negative    Specific Gravity, UA 1.013 1.001 - 1.030   Basic Metabolic Panel   Result Value Ref Range    Sodium 141 136 - 145 mmol/L    Potassium 3.4 (L) 3.5 - 5.0 mmol/L    Chloride 105 98 - 107 mmol/L    CO2 29 22 - 31 mmol/L    Anion Gap, Calculation 7 5 - 18 mmol/L    Glucose 120 70 - 125 mg/dL    Calcium 8.7 8.5 - 10.5 mg/dL    BUN 13 8 - 22 mg/dL    Creatinine 0.83 0.60 - 1.10 mg/dL    GFR MDRD Af Amer >60 >60 mL/min/1.73m2    GFR MDRD Non Af Amer >60 >60 mL/min/1.73m2   HM2 (CBC W/O DIFF)   Result Value Ref Range    WBC 6.9 4.0 - 11.0 thou/uL    RBC 4.34 3.80 - 5.40 mill/uL    Hemoglobin 12.6 12.0 - 16.0 g/dL    Hematocrit 36.7 35.0 - 47.0 %    MCV 85 80 - 100 fL    MCH 29.0 27.0 - 34.0 pg    MCHC 34.3 32.0 - 36.0 g/dL    RDW 13.7 11.0 - 14.5 %    Platelets 265 140 - 440 thou/uL    MPV 9.4 8.5 - 12.5 fL   C-Reactive Protein   Result Value Ref Range    CRP 0.2 0.0 - 0.8 mg/dL   Hepatic Profile   Result Value Ref Range    Bilirubin, Total 0.6 0.0 - 1.0 mg/dL    Bilirubin, Direct 0.2 <=0.5 mg/dL    Protein, Total 7.4 6.0 - 8.0 g/dL    Albumin 3.8 3.5 - 5.0 g/dL    Alkaline Phosphatase 72 45 - 120 U/L    AST 17 0 - 40 U/L    ALT 23 0 - 45 U/L   POCT  pregnancy, urine   Result Value Ref Range    POC Preg, Urine Negative Negative    POCT Kit Lot Number [id number]     POCT Kit Expiration Date 11-     Pos Control Valid Control Valid Control    Neg Control Valid Control Valid Control       RADIOLOGY:  Reviewed all pertinent imaging. Please see official radiology report.  Ct Abdomen Pelvis Without Oral Without Iv Contrast    Result Date: 5/13/2021  EXAM: CT ABDOMEN PELVIS WO ORAL WO IV CONTRAST LOCATION: [facility] DATE/TIME: 5/13/2021 7:39 AM INDICATION: Flank pain, kidney stone suspected Right flank pain COMPARISON: None. TECHNIQUE: CT scan of the abdomen and pelvis was performed without oral or IV contrast. Multiplanar reformats were obtained. Dose reduction techniques were used. CONTRAST: None. FINDINGS: LOWER CHEST: Normal. HEPATOBILIARY: Hepatic steatosis. No opaque gallstones. PANCREAS: Normal. SPLEEN: Normal. ADRENAL GLANDS: Normal. KIDNEY/BLADDER: No collecting system stones. Mild bladder wall thickening. BOWEL: Unremarkable bowel and appendix. No obstruction. LYMPH NODES: No adenopathy. VASCULATURE: Nonaneurysmal aorta. PELVIC ORGANS: Normal. MUSCULOSKELETAL: Diastatic ventral wall.     1.  Mild bladder wall thickening suggesting cystitis. 2.  No collecting system stones or hydronephrosis. 3.  Hepatic steatosis. 4.  No other findings to explain symptoms.             I, Augusta Johns, am serving as a scribe to document services personally performed by Dr. Hai Alvarez MD, based on my observation and the provider's statements to me. I, Hai Alvarez MD attest that Augusta Johns is acting in a scribe capacity, has observed my performance of the services and has documented them in accordance with my direction.    Hai Alvarez M.D.  Emergency Medicine  [facility] EMERGENCY ROOM  [address] [id number]  Dept: [phone]  Loc:  [phone]     Hai Alvarez MD  05/13/21 0826

## 2021-06-17 NOTE — PROGRESS NOTES
Left inframammary shooting if I take a deep breath.  2 days.     No sob if sitting    No sob if walk    No cough    No fever.      Still menses every three months.  Neg preg possibility    Two days ago was lifting stuff up stairs four times.  25-30 pounds    coronary artery disease risk management Neg cig  , controlled Hypertension and Hyperlipidemia   No diabetes mellitus   Hyperlipidemia on statin denies muscle pain     Works night    Obesity denies polyuria  Chest pain today      2015 Non STEMI.  Denies exertional chest pain     Seen 2015 and no follow up and had ACS, small dissection and non STEMI.  Cared for in [facility].  Home bps with diastolics in 80s range.  Working on Life style modification and wt loss    ROS: as noted above    OBJECTIVE:   Vitals:    04/05/18 1508   BP: 120/80   Pulse: 80   Temp: 98  F (36.7  C)      Wt is noted.  No diaphoresis  Eyes: nl eom, anicteric   External ears, nose: nl    Neck: nl nodes, supple, thyroid normal   Lungs: clear to ausc   Heart: regular rhythm  Abd: soft nontender     Tender left chest wall mild    No cva (renal) tenderness  Neuro: no weakness  Skin no rash  Joints: uninflamed   No ketotic breath odor noted  Mental: euthymic  Ext: nontender calves   Gait: normal    Bmi:41    ASSESSMENT/PLAN:  Hx nstemi/ risk factor control.  Will help Establish with card  Chest wall pain/ reassurance  Chronic issues stable/ same treatment.     Additional diagnoses and related orders:  1. Coronary artery disease involving native coronary artery  Ambulatory referral to Cardiology   2. Hypertension  amLODIPine (NORVASC) 10 MG tablet   3. Essential hypertension with goal blood pressure less than 140/90  metoprolol tartrate (LOPRESSOR) 100 MG tablet   4. Morbid obesity     5. Essential hypertension       Obesity / Life style modification

## 2021-06-18 NOTE — PROGRESS NOTES
Incidental findings outside during cardiology eval  Breast abnormality found outside this system.  Hx some findings in house.  No palpable mass    Lung nodule without sxs.  Incidental finding    Coronary disease denies chest pain  Hypertension denies chest pain or headache.  ROS: as noted above    OBJECTIVE:   Vitals:    06/04/18 0938   BP: 118/84   Pulse: 72   Resp: 20      Eyes: non icteric, noninflamed  Lungs: no resp distress  Heart: regular  Ankles: no edema  Muscles: nontender  Mental status: euthymic  Neuro: nonfocal  ASSESSMENT/PLAN:      1. Lung nodule  CT Chest With Contrast   2. Breast nodule  Mammo Screening Bilateral   3. Essential hypertension     4. Coronary artery disease involving native coronary artery       Chronic issues stable/ same treatment.

## 2021-06-19 NOTE — LETTER
Letter by Joseph Chaudhari MD at      Author: Joseph Chaudhari MD Service: -- Author Type: --    Filed:  Encounter Date: 5/20/2019 Status: (Other)         [place] Palacios  [address]  [place] [address]             May 20, 2019        Dear Ms. Palacios,    Below are the results from your recent visit:    Resulted Orders   Comprehensive Metabolic Panel   Result Value Ref Range    Sodium 140 136 - 145 mmol/L    Potassium 3.4 (L) 3.5 - 5.0 mmol/L    Chloride 105 98 - 107 mmol/L    CO2 26 22 - 31 mmol/L    Anion Gap, Calculation 9 5 - 18 mmol/L    Glucose 78 70 - 125 mg/dL    BUN 13 8 - 22 mg/dL    Creatinine 0.84 0.60 - 1.10 mg/dL    GFR MDRD Af Amer >60 >60 mL/min/1.73m2    GFR MDRD Non Af Amer >60 >60 mL/min/1.73m2    Bilirubin, Total 0.3 0.0 - 1.0 mg/dL    Calcium 9.3 8.5 - 10.5 mg/dL    Protein, Total 6.9 6.0 - 8.0 g/dL    Albumin 3.6 3.5 - 5.0 g/dL    Alkaline Phosphatase 55 45 - 120 U/L    AST 18 0 - 40 U/L    ALT 16 0 - 45 U/L    Narrative    Fasting Glucose reference range is 70-99 mg/dL per  American Diabetes Association (ADA) guidelines.   LDL Cholesterol, Direct   Result Value Ref Range    Direct LDL 26 <=129 mg/dl   Glycosylated Hemoglobin A1c   Result Value Ref Range    Hemoglobin A1c 5.7 3.5 - 6.0 %   HM2(CBC w/o Differential)   Result Value Ref Range    WBC 6.2 4.0 - 11.0 thou/uL    RBC 4.43 3.80 - 5.40 mill/uL    Hemoglobin 13.2 12.0 - 16.0 g/dL    Hematocrit 39.6 35.0 - 47.0 %    MCV 89 80 - 100 fL    MCH 29.9 27.0 - 34.0 pg    MCHC 33.4 32.0 - 36.0 g/dL    RDW 12.3 11.0 - 14.5 %    Platelets 258 140 - 440 thou/uL    MPV 7.9 7.0 - 10.0 fL       Except for the potassium which is a little low, all test results are normal or not clinically relevant.  Make sure you are taking the potassium.  Eat more fruits and vegetables.    Please call with questions or contact us using [email or web address].    Sincerely,        Electronically signed by Joseph Chaudhari MD

## 2021-06-20 NOTE — LETTER
Letter by Tessy Gerber MD at      Author: Tessy Gerber MD Service: -- Author Type: --    Filed:  Encounter Date: 9/25/2020 Status: (Other)         [place] Palacios  [address]  [facility] [address]             September 25, 2020         Dear Ms. Palacios,    Below are the results from your recent visit:    Resulted Orders   Lipid [place] FASTING   Result Value Ref Range    Cholesterol 91 <=199 mg/dL    Triglycerides 66 <=149 mg/dL    HDL Cholesterol 54 >=50 mg/dL    LDL Calculated 24 <=129 mg/dL    Patient Fasting > 8hrs? No    Comprehensive Metabolic Panel   Result Value Ref Range    Sodium 141 136 - 145 mmol/L    Potassium 3.5 3.5 - 5.0 mmol/L    Chloride 105 98 - 107 mmol/L    CO2 26 22 - 31 mmol/L    Anion Gap, Calculation 10 5 - 18 mmol/L    Glucose 89 70 - 125 mg/dL    BUN 11 8 - 22 mg/dL    Creatinine 0.80 0.60 - 1.10 mg/dL    GFR MDRD Af Amer >60 >60 mL/min/1.73m2    GFR MDRD Non Af Amer >60 >60 mL/min/1.73m2    Bilirubin, Total 0.3 0.0 - 1.0 mg/dL    Calcium 9.0 8.5 - 10.5 mg/dL    Protein, Total 7.2 6.0 - 8.0 g/dL    Albumin 3.9 3.5 - 5.0 g/dL    Alkaline Phosphatase 76 45 - 120 U/L    AST 18 0 - 40 U/L    ALT 21 0 - 45 U/L    Narrative    Fasting Glucose reference range is 70-99 mg/dL per  American Diabetes Association (ADA) guidelines.   Hepatitis C Antibody (Anti-HCV)   Result Value Ref Range    Hepatitis C Ab Negative Negative   HIV Antigen/Antibody Screening Cascade   Result Value Ref Range    HIV Antigen / Antibody Negative Negative    Narrative    Method is Abbott HIV Ag/Ab for the detection of HIV p24 antigen, HIV-1 antibodies and HIV-2 antibodies.   HM1 (CBC with Diff)   Result Value Ref Range    WBC 5.7 4.0 - 11.0 thou/uL    RBC 4.50 3.80 - 5.40 mill/uL    Hemoglobin 13.3 12.0 - 16.0 g/dL    Hematocrit 40.7 35.0 - 47.0 %    MCV 90 80 - 100 fL    MCH 29.5 27.0 - 34.0 pg    MCHC 32.6 32.0 - 36.0 g/dL    RDW 12.1 11.0 - 14.5 %    Platelets 279 140  - 440 thou/uL    MPV 7.6 7.0 - 10.0 fL    Neutrophils % 49 (L) 50 - 70 %    Lymphocytes % 38 20 - 40 %    Monocytes % 10 2 - 10 %    Eosinophils % 3 0 - 6 %    Basophils % 1 0 - 2 %    Neutrophils Absolute 2.8 2.0 - 7.7 thou/uL    Lymphocytes Absolute 2.2 0.8 - 4.4 thou/uL    Monocytes Absolute 0.5 0.0 - 0.9 thou/uL    Eosinophils Absolute 0.2 0.0 - 0.4 thou/uL    Basophils Absolute 0.0 0.0 - 0.2 thou/uL       Your labs are normal.     Please call with questions or contact us using [employer].    Sincerely,        Electronically signed by Tessy Gerber MD

## 2021-06-21 NOTE — LETTER
"Letter by Tessy Gerber MD at      Author: Tessy Gerber MD Service: -- Author Type: --    Filed:  Encounter Date: 10/14/2020 Status: (Other)         [place] Palacios  [address]  [facility] [address]             October 14, 2020         Dear Ms. Yanira,    Below are the results from your recent visit:    Resulted Orders   US Pelvis With Transvaginal Non OB    Narrative    EXAM: US PELVIS WITH TRANSVAGINAL NON OB  LOCATION: [facility]  DATE/TIME: 9/30/2020 10:19 AM    INDICATION: postmenopausal bleeding  COMPARISON: None.  TECHNIQUE: Transabdominal scans were performed. Endovaginal ultrasound was performed to better visualize the adnexa.    FINDINGS:    UTERUS: 15.4 x 5.8 x 4.5 cm. 1.5 cm fibroid posteriorly in the lower uterine segment.    ENDOMETRIUM: 4 mm. Homogeneously smooth.    RIGHT OVARY: 3.5 x 3.0 x 1.5 cm. Normal with normal flow.    LEFT OVARY: 3.0 x 2.1 x 1.8 cm. Normal with flow demonstrated.    No significant free fluid.      Impression    1.  Small 1.5 cm fibroid in the lower uterine segment posteriorly.    2.  Ultrasound pelvis otherwise negative.             Your ultrasound is generally normal except for a small fibroid (fibroids are muscle \"tumors\" - not cancer).  These can cause funny bleeding or pain, but otherwise aren't problematic.      Please call with questions or contact us using [employer].    Sincerely,        Electronically signed by Tessy Gerber MD       "

## 2021-06-21 NOTE — LETTER
Letter by Tessy Gerber MD at      Author: Tessy Gerber MD Service: -- Author Type: --    Filed:  Encounter Date: 10/14/2020 Status: (Other)         [place] HOP[place]ardobar[place]  [address]  [facility] [address]             October 14, 2020         Dear Ms. Philip,    Below are the results from your recent visit:    Resulted Orders   Mammo Diagnostic Left    Narrative    EXAM: MAMMO DIAGNOSTIC 3D LEFT  LOCATION: [facility]  DATE/TIME: 10/9/2020 3:05 PM    INDICATION: LT BREAST ASYMMETRY-CALL BACK  COMPARISON: Mammograms dated 09/23/2020, 12/18/2017, 02/10/2016    MAMMOGRAPHIC FINDINGS: Left full-field digital diagnostic mammogram performed. There are scattered areas of fibroglandular density. The asymmetry described on screening mammogram does not persist and is shown represent superimposition of normal appearing   breast tissue. Benign mass in the lateral left breast is unchanged. No evidence for malignancy. Breast tomosynthesis was used in interpretation.       Impression    ACR BI-RADS Category 2: Benign.    Results given to the patient who can resume routine screening mammography.       This is reassuring!    Please call with questions or contact us using [employer].    Sincerely,        Electronically signed by Tessy Gerber MD

## 2021-06-22 NOTE — PROGRESS NOTES
Left lower abd lateral  So painful.  Most after getting up after prolonged sitting.  Sore.  And with rotating to the right.    Worst when getting up out of bed.      Goes to gym.   Four times per week.   Can still do the work out.   Still feels it is there.    Couple wks.    The gym routine same five months.  Nothing in the week prior.       Stools nl  Urine nl  No rash    sxs waxes and wanes     Hurts more with pressure.    No change with urine or stool    Med list is correct except brilinta.   No change in last year or more.    Hypertension denies chest pain or headache.  Obesity denies polyuria   Hyperlipidemia on statin denies muscle pain     OBJECTIVE:   Vitals:    12/03/18 1323   BP: (!) 134/94   Pulse: 76   Resp: 20   Temp: 98.3  F (36.8  C)      Wt is noted.  No diaphoresis  Eyes: nl eom, anicteric   External ears, nose: nl    Neck: nl nodes, supple, thyroid normal   Lungs: clear to ausc   Heart: regular rhythm  Abd: soft nontender     No cva (renal) tenderness  Neuro: no weakness  Skin no rash  Joints: uninflamed   No ketotic breath odor noted  Mental: euthymic  Ext: nontender calves   Gait: normal    Body mass index is 39.71 kg/m .  Nl gait heel and toe and reflexes  Mild left flank muscle tender  ASSESSMENT/PLAN:    Additional diagnoses and related orders:  1. Abdominal pain, left lower quadrant  methocarbamol (ROBAXIN) 750 MG tablet    acetaminophen-codeine (TYLENOL #3) 300-30 mg per tablet   2. Essential hypertension     3. Morbid Obesity     4. Pure hypercholesterolemia       Chronic issues stable/ same treatment.  follow up if not better.

## 2021-06-23 NOTE — TELEPHONE ENCOUNTER
RN cannot approve Refill Request    RN can NOT refill this medication PCP messaged that patient is overdue for Labs.    Nisa Field, [facility] Triage/Med Refill 2/6/2019    Requested Prescriptions   Pending Prescriptions Disp Refills     potassium chloride (K-DUR,KLOR-CON) 20 MEQ tablet [Pharmacy Med Name: POTA CHLORIDE 20MEQ ER TAB] 89 tablet 1     Sig: TAKE 1 TABLET BY MOUTH ONCE DAILY    Potassium Supplements Refill Protocol Failed - 2/4/2019  9:31 AM       Failed - Potassium level in last 12 months    No results found for: LN-POTASSIUM         Passed - PCP or prescribing provider visit in past 12 months      Last office visit with prescriber/PCP: 12/3/2018 Joseph Chaudhari MD OR same dept: 12/3/2018 Joseph Chaudhari MD OR same specialty: 12/3/2018 Joseph Chaudhari MD  Last physical: Visit date not found Last MTM visit: Visit date not found   Next visit within 3 mo: Visit date not found  Next physical within 3 mo: Visit date not found  Prescriber OR PCP: Joseph Chaudhari MD  Last diagnosis associated with med order: 1. Hypokalemia  - potassium chloride (K-DUR,KLOR-CON) 20 MEQ tablet [Pharmacy Med Name: POTA CHLORIDE 20MEQ ER TAB]; TAKE 1 TABLET BY MOUTH ONCE DAILY  Dispense: 89 tablet; Refill: 1  - losartan (COZAAR) 50 MG tablet [Pharmacy Med Name: LOSARTAN 50MG TAB]; TAKE 1 TABLET BY MOUTH ONCE DAILY  Dispense: 90 tablet; Refill: 2    2. Hypertension  - losartan (COZAAR) 50 MG tablet [Pharmacy Med Name: LOSARTAN 50MG TAB]; TAKE 1 TABLET BY MOUTH ONCE DAILY  Dispense: 90 tablet; Refill: 2    If protocol passes may refill for 12 months if within 3 months of last provider visit (or a total of 15 months).             losartan (COZAAR) 50 MG tablet [Pharmacy Med Name: LOSARTAN 50MG TAB] 90 tablet 2     Sig: TAKE 1 TABLET BY MOUTH ONCE DAILY    Angiotensin Receptor Blocker Protocol Failed - 2/4/2019  9:31 AM       Failed - Serum potassium within the past 12 months    No results found for: LN-POTASSIUM         Failed - Serum  creatinine within the past 12 months    Creatinine   Date Value Ref Range Status   12/18/2017 0.82 0.60 - 1.10 mg/dL Final            Passed - PCP or prescribing provider visit in past 12 months      Last office visit with prescriber/PCP: 12/3/2018 Joseph Chaudhari MD OR same dept: 12/3/2018 Jospeh Chaudhari MD OR same specialty: 12/3/2018 Joseph Chaudhari MD  Last physical: Visit date not found Last MTM visit: Visit date not found   Next visit within 3 mo: Visit date not found  Next physical within 3 mo: Visit date not found  Prescriber OR PCP: Joseph Chaudhari MD  Last diagnosis associated with med order: 1. Hypokalemia  - potassium chloride (K-DUR,KLOR-CON) 20 MEQ tablet [Pharmacy Med Name: POTA CHLORIDE 20MEQ ER TAB]; TAKE 1 TABLET BY MOUTH ONCE DAILY  Dispense: 89 tablet; Refill: 1  - losartan (COZAAR) 50 MG tablet [Pharmacy Med Name: LOSARTAN 50MG TAB]; TAKE 1 TABLET BY MOUTH ONCE DAILY  Dispense: 90 tablet; Refill: 2    2. Hypertension  - losartan (COZAAR) 50 MG tablet [Pharmacy Med Name: LOSARTAN 50MG TAB]; TAKE 1 TABLET BY MOUTH ONCE DAILY  Dispense: 90 tablet; Refill: 2    If protocol passes may refill for 12 months if within 3 months of last provider visit (or a total of 15 months).            Passed - Blood pressure filed in past 12 months    BP Readings from Last 1 Encounters:   12/03/18 (!) 134/94

## 2021-06-25 NOTE — TELEPHONE ENCOUNTER
RN cannot approve Refill Request    RN can NOT refill these medications Protocol failed and NO refill given. Last office visit: 12/3/2018 Joseph Chaudhari MD Last Physical: Visit date not found Last MTM visit: Visit date not found Last visit same specialty: 12/3/2018 Joseph Chaudhari MD.  Next visit within 3 mo: Visit date not found  Next physical within 3 mo: Visit date not found      Patsy Khan, [facility] Triage/Med Refill 3/15/2019      Requested Prescriptions   Pending Prescriptions Disp Refills     losartan (COZAAR) 50 MG tablet [Pharmacy Med Name: LOSARTAN 50MG TAB] 30 tablet 0     Sig: TAKE 1 TABLET BY MOUTH ONCE DAILY ,  PATIENT  NEEDS  APPOINTMENT  FOR  FURTHER  REFILLS.    Angiotensin Receptor Blocker Protocol Failed - 3/11/2019  3:27 PM       Failed - Serum potassium within the past 12 months    No results found for: LN-POTASSIUM         Failed - Serum creatinine within the past 12 months    Creatinine   Date Value Ref Range Status   12/18/2017 0.82 0.60 - 1.10 mg/dL Final            Passed - PCP or prescribing provider visit in past 12 months      Last office visit with prescriber/PCP: 12/3/2018 Joseph Chaudhari MD OR same dept: 12/3/2018 Joseph Chaudhari MD OR same specialty: 12/3/2018 Joseph Chaudhari MD  Last physical: Visit date not found Last MTM visit: Visit date not found   Next visit within 3 mo: Visit date not found  Next physical within 3 mo: Visit date not found  Prescriber OR PCP: Joseph Chaudhari MD  Last diagnosis associated with med order: 1. Hypertension  - losartan (COZAAR) 50 MG tablet [Pharmacy Med Name: LOSARTAN 50MG TAB]; TAKE 1 TABLET BY MOUTH ONCE DAILY ,  PATIENT  NEEDS  APPOINTMENT  FOR  FURTHER  REFILLS.  Dispense: 30 tablet; Refill: 0  - amLODIPine (NORVASC) 10 MG tablet; TAKE 1 TABLET BY MOUTH ONCE DAILY  Dispense: 90 tablet; Refill: 3    2. Hypokalemia  - losartan (COZAAR) 50 MG tablet [Pharmacy Med Name: LOSARTAN 50MG TAB]; TAKE 1 TABLET BY MOUTH ONCE DAILY ,  PATIENT  NEEDS   APPOINTMENT  FOR  FURTHER  REFILLS.  Dispense: 30 tablet; Refill: 0  - potassium chloride (K-DUR,KLOR-CON) 20 MEQ tablet [Pharmacy Med Name: POTA CHLORIDE 20MEQ ER TAB]; TAKE 1 TABLET BY MOUTH ONCE DAILY ,  PATIENT  NEEDS  APPOINTMENT  FOR  FURTHER  REFILLS.  Dispense: 30 tablet; Refill: 0    3. Pure hypercholesterolemia  - atorvastatin (LIPITOR) 40 MG tablet; TAKE ONE TABLET BY MOUTH AT BEDTIME  Dispense: 90 tablet; Refill: 3    If protocol passes may refill for 12 months if within 3 months of last provider visit (or a total of 15 months).            Passed - Blood pressure filed in past 12 months    BP Readings from Last 1 Encounters:   12/03/18 (!) 134/94             potassium chloride (K-DUR,KLOR-CON) 20 MEQ tablet [Pharmacy Med Name: POTA CHLORIDE 20MEQ ER TAB] 30 tablet 0     Sig: TAKE 1 TABLET BY MOUTH ONCE DAILY ,  PATIENT  NEEDS  APPOINTMENT  FOR  FURTHER  REFILLS.    Potassium Supplements Refill Protocol Failed - 3/11/2019  3:27 PM       Failed - Potassium level in last 12 months    No results found for: LN-POTASSIUM         Passed - PCP or prescribing provider visit in past 12 months      Last office visit with prescriber/PCP: 12/3/2018 Joseph Chaudhari MD OR same dept: 12/3/2018 Joseph Chaudhari MD OR same specialty: 12/3/2018 Joseph Chaudhari MD  Last physical: Visit date not found Last MTM visit: Visit date not found   Next visit within 3 mo: Visit date not found  Next physical within 3 mo: Visit date not found  Prescriber OR PCP: Joseph Chaudhari MD  Last diagnosis associated with med order: 1. Hypertension  - losartan (COZAAR) 50 MG tablet [Pharmacy Med Name: LOSARTAN 50MG TAB]; TAKE 1 TABLET BY MOUTH ONCE DAILY ,  PATIENT  NEEDS  APPOINTMENT  FOR  FURTHER  REFILLS.  Dispense: 30 tablet; Refill: 0  - amLODIPine (NORVASC) 10 MG tablet; TAKE 1 TABLET BY MOUTH ONCE DAILY  Dispense: 90 tablet; Refill: 3    2. Hypokalemia  - losartan (COZAAR) 50 MG tablet [Pharmacy Med Name: LOSARTAN 50MG TAB]; TAKE 1 TABLET BY MOUTH ONCE  DAILY ,  PATIENT  NEEDS  APPOINTMENT  FOR  FURTHER  REFILLS.  Dispense: 30 tablet; Refill: 0  - potassium chloride (K-DUR,KLOR-CON) 20 MEQ tablet [Pharmacy Med Name: POTA CHLORIDE 20MEQ ER TAB]; TAKE 1 TABLET BY MOUTH ONCE DAILY ,  PATIENT  NEEDS  APPOINTMENT  FOR  FURTHER  REFILLS.  Dispense: 30 tablet; Refill: 0    3. Pure hypercholesterolemia  - atorvastatin (LIPITOR) 40 MG tablet; TAKE ONE TABLET BY MOUTH AT BEDTIME  Dispense: 90 tablet; Refill: 3    If protocol passes may refill for 12 months if within 3 months of last provider visit (or a total of 15 months).

## 2021-06-25 NOTE — TELEPHONE ENCOUNTER
Refill Approved    Rx renewed Amlodipine and Atorvastatin per Medication Renewal Policy. Amlodipine was last renewed on 04/05/2018 and Atorvastatin on 1/07/2018, last office visit was 12/03/2018.    Patsy Khan, [facility] Triage/Med Refill 3/15/2019       amLODIPine (NORVASC) 10 MG tablet [Pharmacy Med Name: AMLODIPINE 10MG TAB] 90 tablet 3     Sig: TAKE 1 TABLET BY MOUTH ONCE DAILY    Calcium-Channel Blockers Protocol Passed - 3/11/2019  3:27 PM       Passed - PCP or prescribing provider visit in past 12 months or next 3 months    Last office visit with prescriber/PCP: 12/3/2018 Joseph Chaudhari MD OR same dept: 12/3/2018 Joseph Chaudhari MD OR same specialty: 12/3/2018 Joseph Chaudhari MD  Last physical: Visit date not found Last MTM visit: Visit date not found   Next visit within 3 mo: Visit date not found  Next physical within 3 mo: Visit date not found  Prescriber OR PCP: Joseph Chaudhari MD  Last diagnosis associated with med order: 1. Hypertension  - losartan (COZAAR) 50 MG tablet [Pharmacy Med Name: LOSARTAN 50MG TAB]; TAKE 1 TABLET BY MOUTH ONCE DAILY ,  PATIENT  NEEDS  APPOINTMENT  FOR  FURTHER  REFILLS.  Dispense: 30 tablet; Refill: 0  - amLODIPine (NORVASC) 10 MG tablet [Pharmacy Med Name: AMLODIPINE 10MG TAB]; TAKE 1 TABLET BY MOUTH ONCE DAILY  Dispense: 90 tablet; Refill: 3    2. Hypokalemia  - losartan (COZAAR) 50 MG tablet [Pharmacy Med Name: LOSARTAN 50MG TAB]; TAKE 1 TABLET BY MOUTH ONCE DAILY ,  PATIENT  NEEDS  APPOINTMENT  FOR  FURTHER  REFILLS.  Dispense: 30 tablet; Refill: 0  - potassium chloride (K-DUR,KLOR-CON) 20 MEQ tablet [Pharmacy Med Name: POTA CHLORIDE 20MEQ ER TAB]; TAKE 1 TABLET BY MOUTH ONCE DAILY ,  PATIENT  NEEDS  APPOINTMENT  FOR  FURTHER  REFILLS.  Dispense: 30 tablet; Refill: 0    3. Pure hypercholesterolemia  - atorvastatin (LIPITOR) 40 MG tablet [Pharmacy Med Name: ATORVASTATIN 40MG   TAB]; TAKE ONE TABLET BY MOUTH AT BEDTIME  Dispense: 90 tablet; Refill: 3    If protocol passes  may refill for 12 months if within 3 months of last provider visit (or a total of 15 months).            Passed - Blood pressure filed in past 12 months    BP Readings from Last 1 Encounters:   12/03/18 (!) 134/94             atorvastatin (LIPITOR) 40 MG tablet [Pharmacy Med Name: ATORVASTATIN 40MG   TAB] 90 tablet 3     Sig: TAKE ONE TABLET BY MOUTH AT BEDTIME    Statins Refill Protocol (Hmg CoA Reductase Inhibitors) Passed - 3/11/2019  3:27 PM       Passed - PCP or prescribing provider visit in past 12 months     Last office visit with prescriber/PCP: 12/3/2018 Joseph Chaudhari MD OR same dept: 12/3/2018 Joseph Chaudhari MD OR same specialty: 12/3/2018 Joseph Chaudhari MD  Last physical: Visit date not found Last MTM visit: Visit date not found   Next visit within 3 mo: Visit date not found  Next physical within 3 mo: Visit date not found  Prescriber OR PCP: Joseph Chaudhari MD  Last diagnosis associated with med order: 1. Hypertension  - losartan (COZAAR) 50 MG tablet [Pharmacy Med Name: LOSARTAN 50MG TAB]; TAKE 1 TABLET BY MOUTH ONCE DAILY ,  PATIENT  NEEDS  APPOINTMENT  FOR  FURTHER  REFILLS.  Dispense: 30 tablet; Refill: 0  - amLODIPine (NORVASC) 10 MG tablet [Pharmacy Med Name: AMLODIPINE 10MG TAB]; TAKE 1 TABLET BY MOUTH ONCE DAILY  Dispense: 90 tablet; Refill: 3    2. Hypokalemia  - losartan (COZAAR) 50 MG tablet [Pharmacy Med Name: LOSARTAN 50MG TAB]; TAKE 1 TABLET BY MOUTH ONCE DAILY ,  PATIENT  NEEDS  APPOINTMENT  FOR  FURTHER  REFILLS.  Dispense: 30 tablet; Refill: 0  - potassium chloride (K-DUR,KLOR-CON) 20 MEQ tablet [Pharmacy Med Name: POTA CHLORIDE 20MEQ ER TAB]; TAKE 1 TABLET BY MOUTH ONCE DAILY ,  PATIENT  NEEDS  APPOINTMENT  FOR  FURTHER  REFILLS.  Dispense: 30 tablet; Refill: 0    3. Pure hypercholesterolemia  - atorvastatin (LIPITOR) 40 MG tablet [Pharmacy Med Name: ATORVASTATIN 40MG   TAB]; TAKE ONE TABLET BY MOUTH AT BEDTIME  Dispense: 90 tablet; Refill: 3    If protocol passes may refill for 12 months if  within 3 months of last provider visit (or a total of 15 months).

## 2021-08-09 DIAGNOSIS — I24.9 ACS (ACUTE CORONARY SYNDROME) (H): ICD-10-CM

## 2021-08-09 DIAGNOSIS — I25.42 DISSECTION OF CORONARY ARTERY: ICD-10-CM

## 2021-08-09 DIAGNOSIS — I10 ESSENTIAL HYPERTENSION: ICD-10-CM

## 2021-08-09 DIAGNOSIS — I21.9 ACUTE MYOCARDIAL INFARCTION, INITIAL EPISODE OF CARE (H): ICD-10-CM

## 2021-08-11 RX ORDER — POTASSIUM CHLORIDE 1500 MG/1
TABLET, EXTENDED RELEASE ORAL
Qty: 90 TABLET | Refills: 0 | Status: SHIPPED | OUTPATIENT
Start: 2021-08-11 | End: 2021-11-08

## 2021-08-11 RX ORDER — LOSARTAN POTASSIUM 50 MG/1
TABLET ORAL
Qty: 90 TABLET | Refills: 0 | Status: SHIPPED | OUTPATIENT
Start: 2021-08-11 | End: 2021-11-08

## 2021-08-11 RX ORDER — ATORVASTATIN CALCIUM 40 MG/1
TABLET, FILM COATED ORAL
Qty: 90 TABLET | Refills: 0 | Status: SHIPPED | OUTPATIENT
Start: 2021-08-11 | End: 2021-11-07

## 2021-08-11 RX ORDER — AMLODIPINE BESYLATE 10 MG/1
TABLET ORAL
Qty: 90 TABLET | Refills: 0 | Status: SHIPPED | OUTPATIENT
Start: 2021-08-11 | End: 2021-11-07

## 2021-08-11 RX ORDER — METOPROLOL TARTRATE 100 MG
TABLET ORAL
Qty: 180 TABLET | Refills: 0 | Status: SHIPPED | OUTPATIENT
Start: 2021-08-11 | End: 2021-11-07

## 2021-08-11 NOTE — TELEPHONE ENCOUNTER
"lisinopriL (PRINIVIL,ZESTRIL) 10 MG tablet 90 tablet 0 5/12/2021  No   Sig - Route: Take 1 tablet (10 mg total) by mouth daily. - Oral   Sent to pharmacy as: lisinopriL 10 mg tablet (PRINIVIL,ZESTRIL)   Notes to Pharmacy: Needs appt to [facility] with new primary   E-Prescribing Status: Receipt confirmed by pharmacy (5/12/2021  1:12 PM CDT)     amLODIPine (NORVASC) 10 MG tablet 90 tablet 0 5/4/2021  No   Sig: Take 1 tablet by mouth once daily   Sent to pharmacy as: amLODIPine 10 mg tablet (NORVASC)   E-Prescribing Status: Receipt confirmed by pharmacy (5/4/2021  8:05 AM CDT)     atorvastatin (LIPITOR) 40 MG tablet 90 tablet 0 5/4/2021  No   Sig: TAKE 1 TABLET BY MOUTH ONCE DAILY IN THE EVENING   Sent to pharmacy as: atorvastatin 40 mg tablet (LIPITOR)   E-Prescribing Status: Receipt confirmed by pharmacy (5/4/2021  8:05 AM CDT)       Last Written Prescription Date:  5/4/21  Last Fill Quantity: 90/180,  # refills: 0   Last office visit provider:  9/23/20     Requested Prescriptions   Pending Prescriptions Disp Refills     potassium chloride ER (KLOR-CON M) 20 MEQ CR tablet [Pharmacy Med Name: Potassium Chloride Heena ER 20 MEQ Oral Tablet Extended Release] 90 tablet 0     Sig: Take 1 tablet by mouth once daily       Potassium Supplements Protocol Failed - 8/9/2021  2:55 PM        Failed - Medication is active on med list        Failed - Normal serum potassium in past 12 months     Recent Labs   Lab Test 05/13/21  0652   POTASSIUM 3.4*                    Passed - Recent (12 mo) or future (30 days) visit within the authorizing provider's department     Patient has had an office visit with the authorizing provider or a provider within the authorizing providers department within the previous 12 mos or has a future within next 30 days. See \"Patient Info\" tab in inbasket, or \"Choose Columns\" in Meds & Orders section of the refill encounter.              Passed - Patient is age 18 or older           atorvastatin (LIPITOR) 40 " "MG tablet [Pharmacy Med Name: Atorvastatin Calcium 40 MG Oral Tablet] 90 tablet 0     Sig: TAKE 1 TABLET BY MOUTH ONCE DAILY IN THE EVENING       Statins Protocol Failed - 8/9/2021  2:55 PM        Failed - LDL on file in past 12 months     Recent Labs   Lab Test 09/23/20  1223   LDL 24             Passed - No abnormal creatine kinase in past 12 months     No lab results found.             Passed - Recent (12 mo) or future (30 days) visit within the authorizing provider's specialty     Patient has had an office visit with the authorizing provider or a provider within the authorizing providers department within the previous 12 mos or has a future within next 30 days. See \"Patient Info\" tab in inbasket, or \"Choose Columns\" in Meds & Orders section of the refill encounter.              Passed - Medication is active on med list        Passed - Patient is age 18 or older        Passed - No active pregnancy on record        Passed - No positive pregnancy test in past 12 months           amLODIPine (NORVASC) 10 MG tablet [Pharmacy Med Name: amLODIPine Besylate 10 MG Oral Tablet] 90 tablet 0     Sig: Take 1 tablet by mouth once daily       Calcium Channel Blockers Protocol  Passed - 8/9/2021  2:55 PM        Passed - Blood pressure under 140/90 in past 12 months     BP Readings from Last 3 Encounters:   09/23/20 126/79   03/14/19 137/90   09/06/18 134/85                 Passed - Recent (12 mo) or future (30 days) visit within the authorizing provider's specialty     Patient has had an office visit with the authorizing provider or a provider within the authorizing providers department within the previous 12 mos or has a future within next 30 days. See \"Patient Info\" tab in inbasket, or \"Choose Columns\" in Meds & Orders section of the refill encounter.              Passed - Medication is active on med list        Passed - Patient is age 18 or older        Passed - No active pregnancy on record        Passed - Normal serum " "creatinine on file in past 12 months     Recent Labs   Lab Test 05/13/21  0652   CR 0.83       Ok to refill medication if creatinine is low          Passed - No positive pregnancy test in past 12 months           losartan (COZAAR) 50 MG tablet [Pharmacy Med Name: Losartan Potassium 50 MG Oral Tablet] 90 tablet 0     Sig: Take 1 tablet by mouth once daily       Angiotensin-II Receptors Failed - 8/9/2021  2:55 PM        Failed - Normal serum potassium on file in past 12 months     Recent Labs   Lab Test 05/13/21  0652   POTASSIUM 3.4*                    Passed - Last blood pressure under 140/90 in past 12 months     BP Readings from Last 3 Encounters:   09/23/20 126/79   03/14/19 137/90   09/06/18 134/85                 Passed - Recent (12 mo) or future (30 days) visit within the authorizing provider's specialty     Patient has had an office visit with the authorizing provider or a provider within the authorizing providers department within the previous 12 mos or has a future within next 30 days. See \"Patient Info\" tab in inbasket, or \"Choose Columns\" in Meds & Orders section of the refill encounter.              Passed - Medication is active on med list        Passed - Patient is age 18 or older        Passed - No active pregnancy on record        Passed - Normal serum creatinine on file in past 12 months     Recent Labs   Lab Test 05/13/21  0652   CR 0.83       Ok to refill medication if creatinine is low          Passed - No positive pregnancy test in past 12 months           metoprolol tartrate (LOPRESSOR) 100 MG tablet [Pharmacy Med Name: Metoprolol Tartrate 100 MG Oral Tablet] 180 tablet 0     Sig: Take 1 tablet by mouth twice daily       Beta-Blockers Protocol Passed - 8/9/2021  2:55 PM        Passed - Blood pressure under 140/90 in past 12 months     BP Readings from Last 3 Encounters:   09/23/20 126/79   03/14/19 137/90   09/06/18 134/85                 Passed - Patient is age 6 or older        Passed - " "Recent (12 mo) or future (30 days) visit within the authorizing provider's specialty     Patient has had an office visit with the authorizing provider or a provider within the authorizing providers department within the previous 12 mos or has a future within next 30 days. See \"Patient Info\" tab in inbasket, or \"Choose Columns\" in Meds & Orders section of the refill encounter.              Passed - Medication is active on med list             Pastor López RN 08/11/21 3:21 PM  "

## 2021-08-11 NOTE — TELEPHONE ENCOUNTER
potassium chloride (K-DUR,KLOR-CON) 20 MEQ tablet 90 tablet 0 5/4/2021  No   Sig: TAKE 1  BY MOUTH ONCE DAILY   Sent to pharmacy as: potassium chloride ER 20 mEq tablet,extended release(part/cryst) (K-DUR,KLOR-CON)   E-Prescribing Status: Receipt confirmed by pharmacy (5/4/2021  8:05 AM CDT)   potassium chloride (K-DUR,KLOR-CON) 20 MEQ tablet [[id number]]    Electronically signed by: Tessy Gerber MD on 05/04/21 0804 Status: Active   Ordering user: Tessy Gerber MD 05/04/21 0804 Authorized by: Tessy Gerber MD   Frequency:  05/04/21 - Until Discontinued Released by: Tessy Gerber MD 05/04/21 0804   Diagnoses  Hypokalemia [E87.6]     losartan (COZAAR) 50 MG tablet 90 tablet 0 5/4/2021  No   Sig: Take 1 tablet by mouth once daily   Sent to pharmacy as: losartan 50 mg tablet (COZAAR)   E-Prescribing Status: Receipt confirmed by pharmacy (5/4/2021  8:05 AM CDT)   losartan (COZAAR) 50 MG tablet [[id number]]    Electronically signed by: Tessy Gerber MD on 05/04/21 0804 Status: Active   Ordering user: Tessy Gerber MD 05/04/21 0804 Authorized by: Tessy Gerber MD   Frequency:  05/04/21 - Until Discontinued Released by: Tessy Gerber MD 05/04/21 0804   Diagnoses  Hypertension [I10]  Hypokalemia [E87.6]       Routing refill request to provider for review/approval because:  Labs out of range:  Multiple     Last Written Prescription Date:  5/4/21  Last Fill Quantity: 90,  # refills: 0   Last office visit provider:  9/23/20     Requested Prescriptions   Pending Prescriptions Disp Refills     potassium chloride ER (KLOR-CON M) 20 MEQ CR tablet [Pharmacy Med Name: Potassium Chloride Heena ER 20 MEQ Oral Tablet Extended Release] 90 tablet 0     Sig: Take 1 tablet by mouth once daily       Potassium Supplements Protocol Failed - 8/9/2021  2:55 PM        Failed - Medication is active on med list        Failed -  "Normal serum potassium in past 12 months     Recent Labs   Lab Test 05/13/21  0652   POTASSIUM 3.4*                    Passed - Recent (12 mo) or future (30 days) visit within the authorizing provider's department     Patient has had an office visit with the authorizing provider or a provider within the authorizing providers department within the previous 12 mos or has a future within next 30 days. See \"Patient Info\" tab in inbasket, or \"Choose Columns\" in Meds & Orders section of the refill encounter.              Passed - Patient is age 18 or older           losartan (COZAAR) 50 MG tablet [Pharmacy Med Name: Losartan Potassium 50 MG Oral Tablet] 90 tablet 0     Sig: Take 1 tablet by mouth once daily       Angiotensin-II Receptors Failed - 8/9/2021  2:55 PM        Failed - Normal serum potassium on file in past 12 months     Recent Labs   Lab Test 05/13/21  0652   POTASSIUM 3.4*                    Passed - Last blood pressure under 140/90 in past 12 months     BP Readings from Last 3 Encounters:   09/23/20 126/79   03/14/19 137/90   09/06/18 134/85                 Passed - Recent (12 mo) or future (30 days) visit within the authorizing provider's specialty     Patient has had an office visit with the authorizing provider or a provider within the authorizing providers department within the previous 12 mos or has a future within next 30 days. See \"Patient Info\" tab in inbasket, or \"Choose Columns\" in Meds & Orders section of the refill encounter.              Passed - Medication is active on med list        Passed - Patient is age 18 or older        Passed - No active pregnancy on record        Passed - Normal serum creatinine on file in past 12 months     Recent Labs   Lab Test 05/13/21  0652   CR 0.83       Ok to refill medication if creatinine is low          Passed - No positive pregnancy test in past 12 months         Signed Prescriptions Disp Refills    atorvastatin (LIPITOR) 40 MG tablet 90 tablet 0     Sig: " "TAKE 1 TABLET BY MOUTH ONCE DAILY IN THE EVENING       Statins Protocol Failed - 8/9/2021  2:55 PM        Failed - LDL on file in past 12 months     Recent Labs   Lab Test 09/23/20  1223   LDL 24             Passed - No abnormal creatine kinase in past 12 months     No lab results found.             Passed - Recent (12 mo) or future (30 days) visit within the authorizing provider's specialty     Patient has had an office visit with the authorizing provider or a provider within the authorizing providers department within the previous 12 mos or has a future within next 30 days. See \"Patient Info\" tab in inbasket, or \"Choose Columns\" in Meds & Orders section of the refill encounter.              Passed - Medication is active on med list        Passed - Patient is age 18 or older        Passed - No active pregnancy on record        Passed - No positive pregnancy test in past 12 months          amLODIPine (NORVASC) 10 MG tablet 90 tablet 0     Sig: Take 1 tablet by mouth once daily       Calcium Channel Blockers Protocol  Passed - 8/9/2021  2:55 PM        Passed - Blood pressure under 140/90 in past 12 months     BP Readings from Last 3 Encounters:   09/23/20 126/79   03/14/19 137/90   09/06/18 134/85                 Passed - Recent (12 mo) or future (30 days) visit within the authorizing provider's specialty     Patient has had an office visit with the authorizing provider or a provider within the authorizing providers department within the previous 12 mos or has a future within next 30 days. See \"Patient Info\" tab in inbasket, or \"Choose Columns\" in Meds & Orders section of the refill encounter.              Passed - Medication is active on med list        Passed - Patient is age 18 or older        Passed - No active pregnancy on record        Passed - Normal serum creatinine on file in past 12 months     Recent Labs   Lab Test 05/13/21  0652   CR 0.83       Ok to refill medication if creatinine is low          Passed " "- No positive pregnancy test in past 12 months          metoprolol tartrate (LOPRESSOR) 100 MG tablet 180 tablet 0     Sig: Take 1 tablet by mouth twice daily       Beta-Blockers Protocol Passed - 8/9/2021  2:55 PM        Passed - Blood pressure under 140/90 in past 12 months     BP Readings from Last 3 Encounters:   09/23/20 126/79   03/14/19 137/90   09/06/18 134/85                 Passed - Patient is age 6 or older        Passed - Recent (12 mo) or future (30 days) visit within the authorizing provider's specialty     Patient has had an office visit with the authorizing provider or a provider within the authorizing providers department within the previous 12 mos or has a future within next 30 days. See \"Patient Info\" tab in inbasket, or \"Choose Columns\" in Meds & Orders section of the refill encounter.              Passed - Medication is active on med list             Pastor López RN 08/11/21 3:26 PM  "

## 2021-08-12 DIAGNOSIS — R07.9 CHEST PAIN, UNSPECIFIED TYPE: Primary | ICD-10-CM

## 2021-08-12 RX ORDER — IBUPROFEN 800 MG/1
800 TABLET, FILM COATED ORAL
COMMUNITY
Start: 2021-05-13 | End: 2021-08-12

## 2021-08-12 RX ORDER — IBUPROFEN 800 MG/1
800 TABLET, FILM COATED ORAL EVERY 8 HOURS PRN
Qty: 60 TABLET | Refills: 0 | Status: SHIPPED | OUTPATIENT
Start: 2021-08-12 | End: 2021-11-12

## 2021-08-12 NOTE — TELEPHONE ENCOUNTER
Reason for Call:  Medication or medication refill:    Do you use a [facility]?  Name of the pharmacy and phone number for the current request:  [facility] [phone]    Name of the medication requested: motrin 800 mg    Other request: no    Can we leave a detailed message on this number? YES    Phone number patient can be reached at: Home number on file [phone] (home)    Best Time: anytime     Call taken on 8/12/2021 at 11:55 AM by Sherice Edgar

## 2021-11-04 DIAGNOSIS — I25.42 DISSECTION OF CORONARY ARTERY: ICD-10-CM

## 2021-11-04 DIAGNOSIS — I24.9 ACS (ACUTE CORONARY SYNDROME) (H): ICD-10-CM

## 2021-11-04 DIAGNOSIS — I10 ESSENTIAL HYPERTENSION: ICD-10-CM

## 2021-11-04 DIAGNOSIS — I21.9 ACUTE MYOCARDIAL INFARCTION, INITIAL EPISODE OF CARE (H): ICD-10-CM

## 2021-11-07 RX ORDER — ATORVASTATIN CALCIUM 40 MG/1
TABLET, FILM COATED ORAL
Qty: 90 TABLET | Refills: 0 | Status: SHIPPED | OUTPATIENT
Start: 2021-11-07

## 2021-11-07 RX ORDER — AMLODIPINE BESYLATE 10 MG/1
TABLET ORAL
Qty: 90 TABLET | Refills: 0 | Status: SHIPPED | OUTPATIENT
Start: 2021-11-07

## 2021-11-07 RX ORDER — METOPROLOL TARTRATE 100 MG
TABLET ORAL
Qty: 180 TABLET | Refills: 0 | Status: SHIPPED | OUTPATIENT
Start: 2021-11-07

## 2021-11-07 NOTE — TELEPHONE ENCOUNTER
"Routing refill request to provider for review/approval because:  BP and labs overdue    Last Written Prescription:      Last office visit provider:  9/23/20    Requested Prescriptions   Pending Prescriptions Disp Refills     amLODIPine (NORVASC) 10 MG tablet [Pharmacy Med Name: amLODIPine Besylate 10 MG Oral Tablet] 90 tablet 0     Sig: Take 1 tablet by mouth once daily       Calcium Channel Blockers Protocol  Failed - 11/4/2021  2:23 PM        Failed - Blood pressure under 140/90 in past 12 months     BP Readings from Last 3 Encounters:   09/23/20 126/79   03/14/19 137/90   09/06/18 134/85                 Failed - Recent (12 mo) or future (30 days) visit within the authorizing provider's specialty     Patient has had an office visit with the authorizing provider or a provider within the authorizing providers department within the previous 12 mos or has a future within next 30 days. See \"Patient Info\" tab in inbasket, or \"Choose Columns\" in Meds & Orders section of the refill encounter.              Passed - Medication is active on med list        Passed - Patient is age 18 or older        Passed - No active pregnancy on record        Passed - Normal serum creatinine on file in past 12 months     Recent Labs   Lab Test 05/13/21  0652   CR 0.83       Ok to refill medication if creatinine is low          Passed - No positive pregnancy test in past 12 months           atorvastatin (LIPITOR) 40 MG tablet [Pharmacy Med Name: Atorvastatin Calcium 40 MG Oral Tablet] 90 tablet 0     Sig: TAKE 1 TABLET BY MOUTH ONCE DAILY IN THE EVENING       Statins Protocol Failed - 11/4/2021  2:23 PM        Failed - LDL on file in past 12 months     Recent Labs   Lab Test 09/23/20  1223   LDL 24             Failed - Recent (12 mo) or future (30 days) visit within the authorizing provider's specialty     Patient has had an office visit with the authorizing provider or a provider within the authorizing providers department within the " "previous 12 mos or has a future within next 30 days. See \"Patient Info\" tab in inbasket, or \"Choose Columns\" in Meds & Orders section of the refill encounter.              Passed - No abnormal creatine kinase in past 12 months     No lab results found.             Passed - Medication is active on med list        Passed - Patient is age 18 or older        Passed - No active pregnancy on record        Passed - No positive pregnancy test in past 12 months           metoprolol tartrate (LOPRESSOR) 100 MG tablet [Pharmacy Med Name: Metoprolol Tartrate 100 MG Oral Tablet] 180 tablet 0     Sig: Take 1 tablet by mouth twice daily       Beta-Blockers Protocol Failed - 11/4/2021  2:23 PM        Failed - Blood pressure under 140/90 in past 12 months     BP Readings from Last 3 Encounters:   09/23/20 126/79   03/14/19 137/90   09/06/18 134/85                 Failed - Recent (12 mo) or future (30 days) visit within the authorizing provider's specialty     Patient has had an office visit with the authorizing provider or a provider within the authorizing providers department within the previous 12 mos or has a future within next 30 days. See \"Patient Info\" tab in inbasket, or \"Choose Columns\" in Meds & Orders section of the refill encounter.              Passed - Patient is age 6 or older        Passed - Medication is active on med list             Klarissa Bertrand RN 11/07/21 6:05 AM  "

## 2021-11-08 DIAGNOSIS — I24.9 ACS (ACUTE CORONARY SYNDROME) (H): ICD-10-CM

## 2021-11-08 DIAGNOSIS — I10 ESSENTIAL HYPERTENSION: ICD-10-CM

## 2021-11-10 RX ORDER — LOSARTAN POTASSIUM 50 MG/1
50 TABLET ORAL DAILY
Qty: 30 TABLET | Refills: 0 | Status: SHIPPED | OUTPATIENT
Start: 2021-11-10

## 2021-11-10 RX ORDER — POTASSIUM CHLORIDE 1500 MG/1
20 TABLET, EXTENDED RELEASE ORAL DAILY
Qty: 30 TABLET | Refills: 0 | Status: SHIPPED | OUTPATIENT
Start: 2021-11-10

## 2021-11-10 NOTE — TELEPHONE ENCOUNTER
"Routing refill request to provider for review/approval because:  Labs out of range:  K+  BP not current  Patient needs to be seen because it has been more than 1 year since last office visit.    Last Written Prescription Date:  8/11/21  Last Fill Quantity: 90,  # refills: 0   Last office visit provider:  9/23/20     Requested Prescriptions   Pending Prescriptions Disp Refills     losartan (COZAAR) 50 MG tablet 90 tablet 0     Sig: Take 1 tablet (50 mg) by mouth daily       Angiotensin-II Receptors Failed - 11/8/2021  3:53 PM        Failed - Last blood pressure under 140/90 in past 12 months     BP Readings from Last 3 Encounters:   09/23/20 126/79   03/14/19 137/90   09/06/18 134/85                 Failed - Recent (12 mo) or future (30 days) visit within the authorizing provider's specialty     Patient has had an office visit with the authorizing provider or a provider within the authorizing providers department within the previous 12 mos or has a future within next 30 days. See \"Patient Info\" tab in inbasket, or \"Choose Columns\" in Meds & Orders section of the refill encounter.              Failed - Normal serum potassium on file in past 12 months     Recent Labs   Lab Test 05/13/21  0652   POTASSIUM 3.4*                    Passed - Medication is active on med list        Passed - Patient is age 18 or older        Passed - No active pregnancy on record        Passed - Normal serum creatinine on file in past 12 months     Recent Labs   Lab Test 05/13/21  0652   CR 0.83       Ok to refill medication if creatinine is low          Passed - No positive pregnancy test in past 12 months           potassium chloride ER (KLOR-CON M) 20 MEQ CR tablet 90 tablet 0     Sig: Take 1 tablet (20 mEq) by mouth daily       Potassium Supplements Protocol Failed - 11/8/2021  3:53 PM        Failed - Recent (12 mo) or future (30 days) visit within the authorizing provider's department     Patient has had an office visit with the " "authorizing provider or a provider within the authorizing providers department within the previous 12 mos or has a future within next 30 days. See \"Patient Info\" tab in inbasket, or \"Choose Columns\" in Meds & Orders section of the refill encounter.              Failed - Normal serum potassium in past 12 months     Recent Labs   Lab Test 05/13/21  0652   POTASSIUM 3.4*                    Passed - Medication is active on med list        Passed - Patient is age 18 or older             Pastor López RN 11/10/21 9:25 AM  "

## 2021-11-11 DIAGNOSIS — R07.9 CHEST PAIN, UNSPECIFIED TYPE: ICD-10-CM

## 2021-11-11 NOTE — TELEPHONE ENCOUNTER
Reason for Call:  Medication or medication refill:    Do you use a [facility]?  Name of the pharmacy and phone number for the current request:  [email or web address]    Name of the medication requested: 800 mg ibuprofen    Other request:     Can we leave a detailed message on this number? YES    Phone number patient can be reached at: Other phone number:  [facility] [phone]    Best Time: asap    Call taken on 11/11/2021 at 9:29 AM by Shi Gaona

## 2021-11-12 RX ORDER — IBUPROFEN 800 MG/1
800 TABLET, FILM COATED ORAL EVERY 8 HOURS PRN
Qty: 60 TABLET | Refills: 0 | Status: SHIPPED | OUTPATIENT
Start: 2021-11-12

## 2021-11-12 NOTE — TELEPHONE ENCOUNTER
"Routing refill request to provider for review/approval because:  BP not current  Patient needs to be seen because it has been more than 1 year since last office visit.    Last Written Prescription Date:  8/12/21  Last Fill Quantity: 60,  # refills: 0   Last office visit provider:  9/23/20     Requested Prescriptions   Pending Prescriptions Disp Refills     ibuprofen (ADVIL/MOTRIN) 800 MG tablet 60 tablet 0     Sig: Take 1 tablet (800 mg) by mouth every 8 hours as needed for moderate pain (take with food)       NSAID Medications Failed - 11/11/2021  9:31 AM        Failed - Blood pressure under 140/90 in past 12 months     BP Readings from Last 3 Encounters:   09/23/20 126/79   03/14/19 137/90   09/06/18 134/85                 Failed - Recent (12 mo) or future (30 days) visit within the authorizing provider's specialty     Patient has had an office visit with the authorizing provider or a provider within the authorizing providers department within the previous 12 mos or has a future within next 30 days. See \"Patient Info\" tab in inbasket, or \"Choose Columns\" in Meds & Orders section of the refill encounter.              Passed - Normal ALT on file in past 12 months     Recent Labs   Lab Test 05/13/21  0652   ALT 23             Passed - Normal AST on file in past 12 months     Recent Labs   Lab Test 05/13/21  0652   AST 17             Passed - Patient is age 6-64 years        Passed - Normal CBC on file in past 12 months     Recent Labs   Lab Test 05/13/21  0652   WBC 6.9   RBC 4.34   HGB 12.6   HCT 36.7                    Passed - Medication is active on med list        Passed - No active pregnancy on record        Passed - Normal serum creatinine on file in past 12 months     Recent Labs   Lab Test 05/13/21  0652   CR 0.83       Ok to refill medication if creatinine is low          Passed - No positive pregnancy test in past 12 months             Pastor López RN 11/12/21 7:12 AM  "

## 2023-01-22 ENCOUNTER — HEALTH MAINTENANCE LETTER (OUTPATIENT)
Age: 58
End: 2023-01-22

## 2024-02-18 ENCOUNTER — HEALTH MAINTENANCE LETTER (OUTPATIENT)
Age: 59
End: 2024-02-18